# Patient Record
Sex: MALE | Race: BLACK OR AFRICAN AMERICAN | NOT HISPANIC OR LATINO | Employment: UNEMPLOYED | URBAN - METROPOLITAN AREA
[De-identification: names, ages, dates, MRNs, and addresses within clinical notes are randomized per-mention and may not be internally consistent; named-entity substitution may affect disease eponyms.]

---

## 2019-10-30 ENCOUNTER — HOSPITAL ENCOUNTER (EMERGENCY)
Facility: HOSPITAL | Age: 39
Discharge: HOME | End: 2019-10-30
Attending: EMERGENCY MEDICINE

## 2019-10-30 VITALS
TEMPERATURE: 98 F | HEART RATE: 90 BPM | WEIGHT: 185 LBS | DIASTOLIC BLOOD PRESSURE: 76 MMHG | SYSTOLIC BLOOD PRESSURE: 120 MMHG | BODY MASS INDEX: 23.74 KG/M2 | HEIGHT: 74 IN | OXYGEN SATURATION: 100 % | RESPIRATION RATE: 25 BRPM

## 2019-10-30 DIAGNOSIS — T50.901A OVERDOSE: Primary | ICD-10-CM

## 2019-10-30 DIAGNOSIS — T50.901A ACCIDENTAL DRUG OVERDOSE, INITIAL ENCOUNTER: ICD-10-CM

## 2019-10-30 LAB
ALBUMIN SERPL BCP-MCNC: 4.4 G/DL (ref 3.5–5.2)
ALP SERPL-CCNC: 73 U/L (ref 55–135)
ALT SERPL W/O P-5'-P-CCNC: 19 U/L (ref 10–44)
AMPHET+METHAMPHET UR QL: NORMAL
ANION GAP SERPL CALC-SCNC: 8 MMOL/L (ref 8–16)
AST SERPL-CCNC: 19 U/L (ref 10–40)
BARBITURATES UR QL SCN>200 NG/ML: NEGATIVE
BASOPHILS # BLD AUTO: 0.05 K/UL (ref 0–0.2)
BASOPHILS NFR BLD: 0.5 % (ref 0–1.9)
BENZODIAZ UR QL SCN>200 NG/ML: NEGATIVE
BILIRUB SERPL-MCNC: 0.3 MG/DL (ref 0.1–1)
BILIRUB UR QL STRIP: NEGATIVE
BUN SERPL-MCNC: 18 MG/DL (ref 6–20)
BZE UR QL SCN: NORMAL
CALCIUM SERPL-MCNC: 8.6 MG/DL (ref 8.7–10.5)
CANNABINOIDS UR QL SCN: NEGATIVE
CHLORIDE SERPL-SCNC: 106 MMOL/L (ref 95–110)
CLARITY UR: CLEAR
CO2 SERPL-SCNC: 24 MMOL/L (ref 23–29)
COLOR UR: YELLOW
CREAT SERPL-MCNC: 0.9 MG/DL (ref 0.5–1.4)
CREAT UR-MCNC: 184.1 MG/DL (ref 23–375)
DIFFERENTIAL METHOD: ABNORMAL
EOSINOPHIL # BLD AUTO: 0.1 K/UL (ref 0–0.5)
EOSINOPHIL NFR BLD: 1 % (ref 0–8)
ERYTHROCYTE [DISTWIDTH] IN BLOOD BY AUTOMATED COUNT: 14.4 % (ref 11.5–14.5)
EST. GFR  (AFRICAN AMERICAN): >60 ML/MIN/1.73 M^2
EST. GFR  (NON AFRICAN AMERICAN): >60 ML/MIN/1.73 M^2
ETHANOL SERPL-MCNC: <10 MG/DL
GLUCOSE SERPL-MCNC: 115 MG/DL (ref 70–110)
GLUCOSE UR QL STRIP: NEGATIVE
HCT VFR BLD AUTO: 38.9 % (ref 40–54)
HGB BLD-MCNC: 12.4 G/DL (ref 14–18)
HGB UR QL STRIP: NEGATIVE
IMM GRANULOCYTES # BLD AUTO: 0.02 K/UL (ref 0–0.04)
IMM GRANULOCYTES NFR BLD AUTO: 0.2 % (ref 0–0.5)
KETONES UR QL STRIP: NEGATIVE
LEUKOCYTE ESTERASE UR QL STRIP: NEGATIVE
LYMPHOCYTES # BLD AUTO: 2.4 K/UL (ref 1–4.8)
LYMPHOCYTES NFR BLD: 21.3 % (ref 18–48)
MCH RBC QN AUTO: 27.9 PG (ref 27–31)
MCHC RBC AUTO-ENTMCNC: 31.9 G/DL (ref 32–36)
MCV RBC AUTO: 87 FL (ref 82–98)
METHADONE UR QL SCN>300 NG/ML: NEGATIVE
MONOCYTES # BLD AUTO: 0.6 K/UL (ref 0.3–1)
MONOCYTES NFR BLD: 5 % (ref 4–15)
NEUTROPHILS # BLD AUTO: 8 K/UL (ref 1.8–7.7)
NEUTROPHILS NFR BLD: 72 % (ref 38–73)
NITRITE UR QL STRIP: NEGATIVE
NRBC BLD-RTO: 0 /100 WBC
OPIATES UR QL SCN: NEGATIVE
PCP UR QL SCN>25 NG/ML: NEGATIVE
PH UR STRIP: 6 [PH] (ref 5–8)
PLATELET # BLD AUTO: 266 K/UL (ref 150–350)
PMV BLD AUTO: 10.9 FL (ref 9.2–12.9)
POTASSIUM SERPL-SCNC: 4.1 MMOL/L (ref 3.5–5.1)
PROT SERPL-MCNC: 7.3 G/DL (ref 6–8.4)
PROT UR QL STRIP: NEGATIVE
RBC # BLD AUTO: 4.45 M/UL (ref 4.6–6.2)
SODIUM SERPL-SCNC: 138 MMOL/L (ref 136–145)
SP GR UR STRIP: 1.02 (ref 1–1.03)
TOXICOLOGY INFORMATION: NORMAL
TROPONIN I SERPL DL<=0.01 NG/ML-MCNC: 0.01 NG/ML (ref 0–0.03)
URN SPEC COLLECT METH UR: NORMAL
UROBILINOGEN UR STRIP-ACNC: NEGATIVE EU/DL
WBC # BLD AUTO: 11.09 K/UL (ref 3.9–12.7)

## 2019-10-30 PROCEDURE — 99284 EMERGENCY DEPT VISIT MOD MDM: CPT | Mod: 25

## 2019-10-30 PROCEDURE — 85025 COMPLETE CBC W/AUTO DIFF WBC: CPT

## 2019-10-30 PROCEDURE — 80320 DRUG SCREEN QUANTALCOHOLS: CPT

## 2019-10-30 PROCEDURE — 80053 COMPREHEN METABOLIC PANEL: CPT

## 2019-10-30 PROCEDURE — 81003 URINALYSIS AUTO W/O SCOPE: CPT | Mod: 59

## 2019-10-30 PROCEDURE — 84484 ASSAY OF TROPONIN QUANT: CPT

## 2019-10-30 PROCEDURE — 80307 DRUG TEST PRSMV CHEM ANLYZR: CPT

## 2019-10-30 RX ORDER — NALOXONE HYDROCHLORIDE 4 MG/.1ML
SPRAY NASAL
Qty: 1 EACH | Refills: 11 | Status: SHIPPED | OUTPATIENT
Start: 2019-10-30

## 2019-10-31 NOTE — ED TRIAGE NOTES
Brought in by EMS for possible overdose. Went to [facility] Med altered and lethargic. EMS gave 2mg narcan and patient became more alert.

## 2019-10-31 NOTE — ED PROVIDER NOTES
Encounter Date: 10/30/2019    SCRIBE #1 NOTE: I, America Jacobs, am scribing for, and in the presence of,  Isac Johnson MD. I have scribed the following portions of the note - Other sections scribed: HPI, ROS, PE, ED Management.       History     Chief Complaint   Patient presents with    Drug Overdose     pt sent from [facility] for [place] overdose. pt was unresponsive and given narcan and is alert, oriented and cooperative on arrival to [facility]     CC: Drug Overdose    HPI: The patient is a 39 y.o male, with SHx of Heroin use, who presents to the ED, complaining of began around 6:30 pm, PTA. Patient was initially brought to [facility] by a friend. Patient was ambulatory there. He was then transferred to this ED. Per EMS, the patient was altered and lethargic. He was given 2 mg of Narcan, with positive response. Patient states that he remembers getting ice cream prior to possible syncopal episode, that was witnessed by a friend. He states that this was his first drug overdose. SHx of heroin use for the past 5 yrs via snorting. Patient admits to snorting heroin this morning and around 5 pm this evening. He states he uses heroin daily.    The history is provided by the patient, the EMS personnel and a friend. No  was used.     Review of patient's allergies indicates:  No Known Allergies  History reviewed. No pertinent past medical history.  No past surgical history on file.  No family history on file.  Social History     Tobacco Use    Smoking status: Former Smoker   Substance Use Topics    Alcohol use: Not Currently    Drug use: Yes     Frequency: 1.0 times per week     Types: Cocaine, Methamphetamines     Review of Systems   Constitutional: Negative.    HENT: Negative.    Respiratory: Negative.    Cardiovascular: Negative.    Gastrointestinal: Negative.    Genitourinary: Negative.    Musculoskeletal: Negative.    Skin: Negative.     Neurological: Positive for syncope.   Psychiatric/Behavioral: Negative.        Physical Exam     Initial Vitals [10/30/19 2040]   BP Pulse Resp Temp SpO2   119/81 90 16 98.7 °F (37.1 °C) 97 %      MAP       --         Physical Exam    Nursing note and vitals reviewed.  Constitutional: He appears well-developed and well-nourished. He is not diaphoretic. No distress.   HENT:   Head: Normocephalic and atraumatic.   Eyes: Pupils are equal, round, and reactive to light.   Neck: Normal range of motion.   Cardiovascular: Normal rate and regular rhythm.   Pulmonary/Chest: Breath sounds normal. No respiratory distress.   Abdominal: Soft.   Musculoskeletal: Normal range of motion. He exhibits no edema or tenderness.   Neurological: He is alert.   Skin: Skin is warm and dry.   Psychiatric: He has a normal mood and affect.         ED Course   Procedures  Labs Reviewed   CBC W/ AUTO DIFFERENTIAL - Abnormal; Notable for the following components:       Result Value    RBC 4.45 (*)     Hemoglobin 12.4 (*)     Hematocrit 38.9 (*)     Mean Corpuscular Hemoglobin Conc 31.9 (*)     Gran # (ANC) 8.0 (*)     All other components within normal limits   COMPREHENSIVE METABOLIC PANEL - Abnormal; Notable for the following components:    Glucose 115 (*)     Calcium 8.6 (*)     All other components within normal limits   DRUG SCREEN PANEL, URINE EMERGENCY    Narrative:     Preferred Collection Type->Urine, Clean Catch   ALCOHOL,MEDICAL (ETHANOL)   TROPONIN I   URINALYSIS, REFLEX TO URINE CULTURE    Narrative:     Preferred Collection Type->Urine, Clean Catch          Imaging Results          X-Ray Chest 1 View (Final result)  Result time 10/30/19 21:50:19    Final result by Valentine Khan MD (10/30/19 21:50:19)                 Impression:      Unremarkable one view of the chest.      Electronically signed by: Valentine Khan  Date:    10/30/2019  Time:    21:50             Narrative:    EXAMINATION:  CHEST ONE VIEW    CLINICAL  HISTORY:  Poisoning by unspecified drugs, medicaments and biological substances, accidental (unintentional), initial encounter    TECHNIQUE:  One view of the chest.    COMPARISON:  None.    FINDINGS:  The cardiac silhouette is within normal limits.   There is no focal consolidation, pneumothorax, or pleural effusion.                                 Medical Decision Making:   History:   Old Medical Records: I decided to obtain old medical records.  Clinical Tests:   Lab Tests: Ordered and Reviewed  Radiological Study: Ordered and Reviewed  ED Management:  2109 Will order basic labs, US, Drug screen panel, Trop, and CXR. Will reassess patient.     2214 Reassessed patient.             Scribe Attestation:   Scribe #1: I performed the above scribed service and the documentation accurately describes the services I performed. I attest to the accuracy of the note.      MDM:    39 y.o.male with PMHx as ntoed above presents with overdose around 2 hrs pta, with reported narcan given pta. Physical exam remarkable for well appearing male, conversign with ease, CTAB.  ED workup remarkable for UDS - amphetamines/cocaine positive, ETOH negative, trop - 0.010, CXR - unremarkable.  Pt presentation consistent with overdose with ED observation > 2 hrs post narcan without any signs of depressed respiratory rate or alternative vital sign abnormality. At this time given patient's history, physical exam, and ED workup do not suspect acute overdose, aspiration, MI/ACS, or any further malignant cause. Pt given script for narcan upon discharge. Discussed diagnosis and further treatment with patient, including f/u with PCP in the next week.  Return precautions given and all questions answered.  Patient in understanding of plan.  Pt discharged to home improved and stable.         I, Isac Johnson M.D., personally performed the services described in this documentation. All medical record entries made by the scribe were at my direction and  in my presence.  I have reviewed the chart and agree that the record reflects my personal performance and is accurate and complete.       Clinical Impression:       ICD-10-CM ICD-9-CM   1. Overdose T50.901A 977.9     E980.5   2. Accidental drug overdose, initial encounter T50.901A 977.9     E858.9                                Isac Johnson MD  10/31/19 0350

## 2020-06-15 ENCOUNTER — HOSPITAL ENCOUNTER (EMERGENCY)
Facility: HOSPITAL | Age: 40
Discharge: HOME | End: 2020-06-16
Attending: EMERGENCY MEDICINE

## 2020-06-15 DIAGNOSIS — F10.920 ALCOHOLIC INTOXICATION WITHOUT COMPLICATION: Primary | ICD-10-CM

## 2020-06-15 DIAGNOSIS — R07.9 CHEST PAIN: ICD-10-CM

## 2020-06-15 LAB
ALBUMIN SERPL BCP-MCNC: 3.8 G/DL (ref 3.5–5.2)
ALP SERPL-CCNC: 76 U/L (ref 55–135)
ALT SERPL W/O P-5'-P-CCNC: 16 U/L (ref 10–44)
ANION GAP SERPL CALC-SCNC: 10 MMOL/L (ref 8–16)
AST SERPL-CCNC: 19 U/L (ref 10–40)
BASOPHILS # BLD AUTO: 0.04 K/UL (ref 0–0.2)
BASOPHILS NFR BLD: 0.5 % (ref 0–1.9)
BILIRUB SERPL-MCNC: 0.2 MG/DL (ref 0.1–1)
BUN SERPL-MCNC: 13 MG/DL (ref 6–20)
CALCIUM SERPL-MCNC: 8.2 MG/DL (ref 8.7–10.5)
CHLORIDE SERPL-SCNC: 116 MMOL/L (ref 95–110)
CO2 SERPL-SCNC: 22 MMOL/L (ref 23–29)
CREAT SERPL-MCNC: 1 MG/DL (ref 0.5–1.4)
DIFFERENTIAL METHOD: ABNORMAL
EOSINOPHIL # BLD AUTO: 0.2 K/UL (ref 0–0.5)
EOSINOPHIL NFR BLD: 2.8 % (ref 0–8)
ERYTHROCYTE [DISTWIDTH] IN BLOOD BY AUTOMATED COUNT: 13.5 % (ref 11.5–14.5)
EST. GFR  (AFRICAN AMERICAN): >60 ML/MIN/1.73 M^2
EST. GFR  (NON AFRICAN AMERICAN): >60 ML/MIN/1.73 M^2
ETHANOL SERPL-MCNC: 358 MG/DL
GLUCOSE SERPL-MCNC: 112 MG/DL (ref 70–110)
HCT VFR BLD AUTO: 40.6 % (ref 40–54)
HGB BLD-MCNC: 13.3 G/DL (ref 14–18)
IMM GRANULOCYTES # BLD AUTO: 0.01 K/UL (ref 0–0.04)
IMM GRANULOCYTES NFR BLD AUTO: 0.1 % (ref 0–0.5)
LIPASE SERPL-CCNC: 46 U/L (ref 4–60)
LYMPHOCYTES # BLD AUTO: 3.1 K/UL (ref 1–4.8)
LYMPHOCYTES NFR BLD: 41.4 % (ref 18–48)
MCH RBC QN AUTO: 27.7 PG (ref 27–31)
MCHC RBC AUTO-ENTMCNC: 32.8 G/DL (ref 32–36)
MCV RBC AUTO: 85 FL (ref 82–98)
MONOCYTES # BLD AUTO: 0.2 K/UL (ref 0.3–1)
MONOCYTES NFR BLD: 3.1 % (ref 4–15)
NEUTROPHILS # BLD AUTO: 3.9 K/UL (ref 1.8–7.7)
NEUTROPHILS NFR BLD: 52.1 % (ref 38–73)
NRBC BLD-RTO: 0 /100 WBC
PLATELET # BLD AUTO: 261 K/UL (ref 150–350)
PMV BLD AUTO: 11.1 FL (ref 9.2–12.9)
POTASSIUM SERPL-SCNC: 3.7 MMOL/L (ref 3.5–5.1)
PROT SERPL-MCNC: 7.3 G/DL (ref 6–8.4)
RBC # BLD AUTO: 4.8 M/UL (ref 4.6–6.2)
SODIUM SERPL-SCNC: 148 MMOL/L (ref 136–145)
WBC # BLD AUTO: 7.42 K/UL (ref 3.9–12.7)

## 2020-06-15 PROCEDURE — 80053 COMPREHEN METABOLIC PANEL: CPT

## 2020-06-15 PROCEDURE — 96360 HYDRATION IV INFUSION INIT: CPT

## 2020-06-15 PROCEDURE — 83880 ASSAY OF NATRIURETIC PEPTIDE: CPT

## 2020-06-15 PROCEDURE — 84484 ASSAY OF TROPONIN QUANT: CPT

## 2020-06-15 PROCEDURE — 93005 ELECTROCARDIOGRAM TRACING: CPT

## 2020-06-15 PROCEDURE — 80320 DRUG SCREEN QUANTALCOHOLS: CPT

## 2020-06-15 PROCEDURE — 93010 EKG 12-LEAD: ICD-10-PCS | Mod: ,,, | Performed by: INTERNAL MEDICINE

## 2020-06-15 PROCEDURE — 93010 ELECTROCARDIOGRAM REPORT: CPT | Mod: ,,, | Performed by: INTERNAL MEDICINE

## 2020-06-15 PROCEDURE — 99285 EMERGENCY DEPT VISIT HI MDM: CPT | Mod: 25

## 2020-06-15 PROCEDURE — 96361 HYDRATE IV INFUSION ADD-ON: CPT

## 2020-06-15 PROCEDURE — 83690 ASSAY OF LIPASE: CPT

## 2020-06-15 PROCEDURE — 25000003 PHARM REV CODE 250: Performed by: NURSE PRACTITIONER

## 2020-06-15 PROCEDURE — 85025 COMPLETE CBC W/AUTO DIFF WBC: CPT

## 2020-06-15 RX ADMIN — SODIUM CHLORIDE 1000 ML: 0.9 INJECTION, SOLUTION INTRAVENOUS at 11:06

## 2020-06-16 VITALS
TEMPERATURE: 99 F | WEIGHT: 180 LBS | RESPIRATION RATE: 23 BRPM | HEART RATE: 99 BPM | BODY MASS INDEX: 23.1 KG/M2 | DIASTOLIC BLOOD PRESSURE: 66 MMHG | HEIGHT: 74 IN | OXYGEN SATURATION: 100 % | SYSTOLIC BLOOD PRESSURE: 109 MMHG

## 2020-06-16 LAB
BNP SERPL-MCNC: 15 PG/ML (ref 0–99)
TROPONIN I SERPL DL<=0.01 NG/ML-MCNC: <0.006 NG/ML (ref 0–0.03)

## 2020-06-16 PROCEDURE — 25000003 PHARM REV CODE 250: Performed by: NURSE PRACTITIONER

## 2020-06-16 RX ORDER — THIAMINE HCL 100 MG
100 TABLET ORAL
Status: COMPLETED | OUTPATIENT
Start: 2020-06-16 | End: 2020-06-16

## 2020-06-16 RX ORDER — FOLIC ACID 1 MG/1
1 TABLET ORAL
Status: COMPLETED | OUTPATIENT
Start: 2020-06-16 | End: 2020-06-16

## 2020-06-16 RX ADMIN — Medication 100 MG: at 01:06

## 2020-06-16 RX ADMIN — FOLIC ACID 1 MG: 1 TABLET ORAL at 01:06

## 2020-06-16 RX ADMIN — THERA TABS 1 TABLET: TAB at 01:06

## 2020-06-16 NOTE — ED PROVIDER NOTES
"Encounter Date: 6/15/2020       History     Chief Complaint   Patient presents with    Alcohol Intoxication     Pt arrives via EMS, found lying in street, +ETOH, +HEROIN, +METH, given 500mL NS en route, upon arrival to ED, Pt AAOX3     CC:  Intoxication    HPI: 40-year-old male with history of overdose presenting to the ED the EMS after he was found lying in the street.  Patient admits to drinking 1 pt of vodka and using meth this evening.  Given 500 mL normal saline by EMS.  He also reports intermittent midsternal chest pain described as "stabbing" that began this evening prior to meth use.  He denies any other pain or symptoms.    The history is provided by the patient. No  was used.     Review of patient's allergies indicates:  No Known Allergies  No past medical history on file.  No past surgical history on file.  No family history on file.  Social History     Tobacco Use    Smoking status: Former Smoker   Substance Use Topics    Alcohol use: Not Currently    Drug use: Yes     Frequency: 1.0 times per week     Types: Cocaine, Methamphetamines     Review of Systems   Constitutional: Negative for chills and fever.   HENT: Negative for sore throat.    Respiratory: Negative for shortness of breath.    Cardiovascular: Positive for chest pain.   Gastrointestinal: Negative for anal bleeding, nausea and vomiting.   Genitourinary: Negative for dysuria.   Musculoskeletal: Negative for back pain.   Skin: Negative for rash.   Neurological: Negative for weakness.   Hematological: Does not bruise/bleed easily.       Physical Exam     Initial Vitals [06/15/20 2217]   BP Pulse Resp Temp SpO2   113/72 104 18 98.5 °F (36.9 °C) 99 %      MAP       --         Physical Exam    Vitals reviewed.  Constitutional: He appears well-developed and well-nourished. He is not diaphoretic.  Non-toxic appearance. He does not have a sickly appearance. He does not appear ill. No distress.   HENT:   Head: Normocephalic and " atraumatic.   Right Ear: External ear normal.   Left Ear: External ear normal.   Nose: Nose normal.   Mouth/Throat: Oropharynx is clear and moist.   Head and face atraumatic.   Eyes: Conjunctivae and EOM are normal. Pupils are equal, round, and reactive to light.   Neck: Normal range of motion.   Cardiovascular: Normal rate, regular rhythm, normal heart sounds and intact distal pulses. Exam reveals no gallop and no friction rub.    No murmur heard.  Pulmonary/Chest: Breath sounds normal. No respiratory distress.   Abdominal: Soft. Bowel sounds are normal. There is no abdominal tenderness.   Musculoskeletal: Normal range of motion.      Comments: No tenderness palpation of the midline cervical, thoracic, lumbar spine. 5/5 strength BUE and BLE.    Neurological: He is alert and oriented to person, place, and time. He has normal strength. GCS eye subscore is 4. GCS verbal subscore is 5. GCS motor subscore is 6.   Intoxicated.  Easily arousable.  Conversant.    Skin: Skin is warm, dry and intact. Capillary refill takes less than 2 seconds. No rash noted. No erythema.   Psychiatric: He has a normal mood and affect. Thought content normal.         ED Course   Procedures  Labs Reviewed   CBC W/ AUTO DIFFERENTIAL - Abnormal; Notable for the following components:       Result Value    Hemoglobin 13.3 (*)     Mono # 0.2 (*)     Mono% 3.1 (*)     All other components within normal limits   COMPREHENSIVE METABOLIC PANEL - Abnormal; Notable for the following components:    Sodium 148 (*)     Chloride 116 (*)     CO2 22 (*)     Glucose 112 (*)     Calcium 8.2 (*)     All other components within normal limits   ALCOHOL,MEDICAL (ETHANOL) - Abnormal; Notable for the following components:    Alcohol, Medical, Serum 358 (*)     All other components within normal limits    Narrative:     ALcohol critical result(s) called and verbal readback obtained from   Alexandra Wallace by DEAN 06/15/2020 23:53  Alcohol critical result(s) called and  verbal readback obtained from   Alexandra Reid by [facility] 06/15/2020 23:52   TROPONIN I   B-TYPE NATRIURETIC PEPTIDE   LIPASE   DRUG SCREEN PANEL, URINE EMERGENCY          Imaging Results          CT Head Without Contrast (Final result)  Result time 06/16/20 00:35:53    Final result by Valentine Khan MD (06/16/20 00:35:53)                 Impression:      No acute intracranial abnormality detected.    Mild vertebral body height loss at C4 without retropulsion.  Question chronicity.  Associated prevertebral soft tissue swelling.      Electronically signed by: Valentine Khan  Date:    06/16/2020  Time:    00:35             Narrative:    EXAMINATION:  CT OF THE HEAD WITHOUT AND CT CERVICAL SPINE    CLINICAL HISTORY:  Altered mental status;; Neck pain, normal neuro exam;    TECHNIQUE:  5 mm unenhanced axial images were obtained from the skull base to the vertex.  1.25 mm axial images were obtained through the cervical spine.    COMPARISON:  None.    FINDINGS:  CT head: The ventricles, basal cisterns, and cortical sulci are within normal limits for patient's stated age. There is no acute intracranial hemorrhage, territorial infarct or mass effect, or midline shift. In the visualized mastoid air cells, there is partial opacification of the inferior left mastoid air cells and partial sclerosing of the right mastoid air cells.  There is mild mucoperiosteal thickening seen in the left sphenoid sinus.    CT cervical spine: There is <normal alignment of the cervical spine>.  There is mild height loss at C4 without retropulsion.  Mild endplate sclerosing is seen at C3/C4.  At C3/C4, there is a left paracentral disc protrusion and mild to moderate central canal narrowing.  The bones are normally mineralized.                               CT Cervical Spine Without Contrast (Final result)  Result time 06/16/20 00:35:53    Final result by Valentine Khan MD (06/16/20 00:35:53)                 Impression:      No acute  intracranial abnormality detected.    Mild vertebral body height loss at C4 without retropulsion.  Question chronicity.  Associated prevertebral soft tissue swelling.      Electronically signed by: Valentine Khan  Date:    06/16/2020  Time:    00:35             Narrative:    EXAMINATION:  CT OF THE HEAD WITHOUT AND CT CERVICAL SPINE    CLINICAL HISTORY:  Altered mental status;; Neck pain, normal neuro exam;    TECHNIQUE:  5 mm unenhanced axial images were obtained from the skull base to the vertex.  1.25 mm axial images were obtained through the cervical spine.    COMPARISON:  None.    FINDINGS:  CT head: The ventricles, basal cisterns, and cortical sulci are within normal limits for patient's stated age. There is no acute intracranial hemorrhage, territorial infarct or mass effect, or midline shift. In the visualized mastoid air cells, there is partial opacification of the inferior left mastoid air cells and partial sclerosing of the right mastoid air cells.  There is mild mucoperiosteal thickening seen in the left sphenoid sinus.    CT cervical spine: There is <normal alignment of the cervical spine>.  There is mild height loss at C4 without retropulsion.  Mild endplate sclerosing is seen at C3/C4.  At C3/C4, there is a left paracentral disc protrusion and mild to moderate central canal narrowing.  The bones are normally mineralized.                               X-Ray Chest AP Portable (Final result)  Result time 06/15/20 23:46:02    Final result by Cecilia Monte MD (06/15/20 23:46:02)                 Impression:      No acute intrathoracic abnormality identified on this single radiographic view of the chest.      Electronically signed by: Cecilia Monte MD  Date:    06/15/2020  Time:    23:46             Narrative:    EXAMINATION:  XR CHEST AP PORTABLE    CLINICAL HISTORY:  Chest Pain;    TECHNIQUE:  Single frontal view of the chest was performed.    COMPARISON:  10/30/2019    FINDINGS:  The cardiomediastinal  silhouette is within normal limits. The visualized airway is unremarkable.  The lungs appear symmetrically aerated without definite focal alveolar consolidation. No large pleural effusion or pneumothorax is appreciated.Visualized osseous structures are intact.                                 Medical Decision Making:   ED Management:  40-year-old male presenting to the ED for evaluation alcohol intoxication.  Brought in by EMS.  Admits to drinking alcohol and methamphetamine use.  Denies any injury or trauma.  He is well-appearing and does not have any signs of injury or trauma.  He is conversing with ease.  CBC and CMP unremarkable.  Lipase normal.  Troponin negative.  Chest x-ray normal.  BNP negative.  Ethanol 358.  Pt presentation consistent with alcohol intoxication with ED observation > 2 hrs without any signs of depressed respiratory rate.  CT scan of head negative for acute process.   CT cervical spine with mild vertebral body height loss at C4.  No retropulsion.  There is possible prevertebral soft tissue swelling.  Patient is asymptomatic.  He has no midline tenderness.  He has no weakness, numbness, tingling.   Likely chronic.  Ambulated without difficulty and continues to be alert and awake.  At this time given patient's history, physical exam, and ED workup do not suspect acute overdose, aspiration, MI/ACS, or any emergent or life-threatening process.  His brother is at the bedside and will care for him this evening.    This case was discussed with my attending physician who agrees with my plan of care.                                    Clinical Impression:       ICD-10-CM ICD-9-CM   1. Alcoholic intoxication without complication  F10.920 305.00   2. Chest pain  R07.9 786.50         Disposition:   Disposition: Discharged  Condition: Stable     ED Disposition Condition    Discharge Stable        ED Prescriptions     None        Follow-up Information     Follow up With Specialties Details Why Contact Info     [facility]  Schedule an appointment as soon as possible for a visit in 1 day For follow-up if you do not have a primary care doctor [address]  [place] [address]  [phone]      [facility] Emergency Medicine Go to  If symptoms worsen [address] [place] [id number] [phone]                                     Malinda Euceda NP  06/16/20 0212

## 2020-06-16 NOTE — DISCHARGE INSTRUCTIONS
Please return to the Emergency Department for any new or worsening symptoms including:  Headache, neck pain, numbness tingling, weakness, extremity weakness, fever, chest pain, shortness of breath, loss of consciousness, dizziness, or any other concerns.     Please follow up with your Primary Care Provider within in the week. If you do not have one, you may contact the one listed on your discharge paperwork or you may also call the [facility] Appointment Desk at [phone] to schedule an appointment with one.

## 2020-06-16 NOTE — ED NOTES
Patient walked with steady gait. Patient denies dizziness or light headedness. Patients brother at bedside

## 2021-12-24 ENCOUNTER — HOSPITAL ENCOUNTER (EMERGENCY)
Facility: HOSPITAL | Age: 41
Discharge: HOME | End: 2021-12-24
Attending: EMERGENCY MEDICINE
Payer: MEDICAID

## 2021-12-24 VITALS
SYSTOLIC BLOOD PRESSURE: 105 MMHG | RESPIRATION RATE: 23 BRPM | HEIGHT: 72 IN | WEIGHT: 180 LBS | OXYGEN SATURATION: 100 % | DIASTOLIC BLOOD PRESSURE: 58 MMHG | HEART RATE: 66 BPM | BODY MASS INDEX: 24.38 KG/M2 | TEMPERATURE: 98 F

## 2021-12-24 DIAGNOSIS — F10.929 ALCOHOL INTOXICATION: ICD-10-CM

## 2021-12-24 DIAGNOSIS — F19.10 POLYSUBSTANCE ABUSE: Primary | ICD-10-CM

## 2021-12-24 DIAGNOSIS — L03.116 CELLULITIS OF LEFT LOWER LEG: ICD-10-CM

## 2021-12-24 LAB
ALBUMIN SERPL BCP-MCNC: 3.6 G/DL (ref 3.5–5.2)
ALP SERPL-CCNC: 80 U/L (ref 55–135)
ALT SERPL W/O P-5'-P-CCNC: 11 U/L (ref 10–44)
AMPHET+METHAMPHET UR QL: ABNORMAL
ANION GAP SERPL CALC-SCNC: 13 MMOL/L (ref 8–16)
APAP SERPL-MCNC: <3 UG/ML (ref 10–20)
AST SERPL-CCNC: 20 U/L (ref 10–40)
BARBITURATES UR QL SCN>200 NG/ML: NEGATIVE
BASOPHILS # BLD AUTO: 0.01 K/UL (ref 0–0.2)
BASOPHILS NFR BLD: 0.1 % (ref 0–1.9)
BENZODIAZ UR QL SCN>200 NG/ML: ABNORMAL
BILIRUB SERPL-MCNC: 0.4 MG/DL (ref 0.1–1)
BUN SERPL-MCNC: 23 MG/DL (ref 6–20)
BZE UR QL SCN: ABNORMAL
CALCIUM SERPL-MCNC: 9.3 MG/DL (ref 8.7–10.5)
CANNABINOIDS UR QL SCN: NEGATIVE
CHLORIDE SERPL-SCNC: 101 MMOL/L (ref 95–110)
CO2 SERPL-SCNC: 22 MMOL/L (ref 23–29)
CREAT SERPL-MCNC: 1.9 MG/DL (ref 0.5–1.4)
CREAT UR-MCNC: 142.2 MG/DL (ref 23–375)
DIFFERENTIAL METHOD: ABNORMAL
EOSINOPHIL # BLD AUTO: 1 K/UL (ref 0–0.5)
EOSINOPHIL NFR BLD: 8.7 % (ref 0–8)
ERYTHROCYTE [DISTWIDTH] IN BLOOD BY AUTOMATED COUNT: 12.8 % (ref 11.5–14.5)
EST. GFR  (AFRICAN AMERICAN): 50 ML/MIN/1.73 M^2
EST. GFR  (NON AFRICAN AMERICAN): 43 ML/MIN/1.73 M^2
ETHANOL SERPL-MCNC: 11 MG/DL
GLUCOSE SERPL-MCNC: 85 MG/DL (ref 70–110)
HCT VFR BLD AUTO: 35.3 % (ref 40–54)
HGB BLD-MCNC: 11.6 G/DL (ref 14–18)
IMM GRANULOCYTES # BLD AUTO: 0.04 K/UL (ref 0–0.04)
IMM GRANULOCYTES NFR BLD AUTO: 0.3 % (ref 0–0.5)
LYMPHOCYTES # BLD AUTO: 1.4 K/UL (ref 1–4.8)
LYMPHOCYTES NFR BLD: 12.5 % (ref 18–48)
MCH RBC QN AUTO: 28.4 PG (ref 27–31)
MCHC RBC AUTO-ENTMCNC: 32.9 G/DL (ref 32–36)
MCV RBC AUTO: 87 FL (ref 82–98)
METHADONE UR QL SCN>300 NG/ML: NEGATIVE
MONOCYTES # BLD AUTO: 0.3 K/UL (ref 0.3–1)
MONOCYTES NFR BLD: 2.5 % (ref 4–15)
NEUTROPHILS # BLD AUTO: 8.7 K/UL (ref 1.8–7.7)
NEUTROPHILS NFR BLD: 75.9 % (ref 38–73)
NRBC BLD-RTO: 0 /100 WBC
OPIATES UR QL SCN: ABNORMAL
PCP UR QL SCN>25 NG/ML: NEGATIVE
PLATELET # BLD AUTO: 223 K/UL (ref 150–450)
PMV BLD AUTO: 11.2 FL (ref 9.2–12.9)
POCT GLUCOSE: 94 MG/DL (ref 70–110)
POTASSIUM SERPL-SCNC: 4.1 MMOL/L (ref 3.5–5.1)
PROT SERPL-MCNC: 6.9 G/DL (ref 6–8.4)
RBC # BLD AUTO: 4.08 M/UL (ref 4.6–6.2)
SALICYLATES SERPL-MCNC: <5 MG/DL (ref 15–30)
SODIUM SERPL-SCNC: 136 MMOL/L (ref 136–145)
TOXICOLOGY INFORMATION: ABNORMAL
WBC # BLD AUTO: 11.48 K/UL (ref 3.9–12.7)

## 2021-12-24 PROCEDURE — 99284 EMERGENCY DEPT VISIT MOD MDM: CPT | Mod: 25

## 2021-12-24 PROCEDURE — 96361 HYDRATE IV INFUSION ADD-ON: CPT

## 2021-12-24 PROCEDURE — 80143 DRUG ASSAY ACETAMINOPHEN: CPT | Performed by: EMERGENCY MEDICINE

## 2021-12-24 PROCEDURE — 80179 DRUG ASSAY SALICYLATE: CPT | Performed by: EMERGENCY MEDICINE

## 2021-12-24 PROCEDURE — 93005 ELECTROCARDIOGRAM TRACING: CPT

## 2021-12-24 PROCEDURE — 93010 EKG 12-LEAD: ICD-10-PCS | Mod: ,,, | Performed by: INTERNAL MEDICINE

## 2021-12-24 PROCEDURE — 93010 ELECTROCARDIOGRAM REPORT: CPT | Mod: ,,, | Performed by: INTERNAL MEDICINE

## 2021-12-24 PROCEDURE — 25000003 PHARM REV CODE 250: Performed by: EMERGENCY MEDICINE

## 2021-12-24 PROCEDURE — 80053 COMPREHEN METABOLIC PANEL: CPT | Performed by: EMERGENCY MEDICINE

## 2021-12-24 PROCEDURE — 82077 ASSAY SPEC XCP UR&BREATH IA: CPT | Performed by: EMERGENCY MEDICINE

## 2021-12-24 PROCEDURE — 80307 DRUG TEST PRSMV CHEM ANLYZR: CPT | Performed by: EMERGENCY MEDICINE

## 2021-12-24 PROCEDURE — 85025 COMPLETE CBC W/AUTO DIFF WBC: CPT | Performed by: EMERGENCY MEDICINE

## 2021-12-24 PROCEDURE — 96374 THER/PROPH/DIAG INJ IV PUSH: CPT

## 2021-12-24 PROCEDURE — 63600175 PHARM REV CODE 636 W HCPCS: Performed by: EMERGENCY MEDICINE

## 2021-12-24 PROCEDURE — 82962 GLUCOSE BLOOD TEST: CPT

## 2021-12-24 RX ORDER — DOXYCYCLINE 100 MG/1
100 CAPSULE ORAL 2 TIMES DAILY
Qty: 20 CAPSULE | Refills: 0 | Status: SHIPPED | OUTPATIENT
Start: 2021-12-24 | End: 2022-01-03

## 2021-12-24 RX ORDER — NALOXONE HCL 0.4 MG/ML
0.4 VIAL (ML) INJECTION
Status: COMPLETED | OUTPATIENT
Start: 2021-12-24 | End: 2021-12-24

## 2021-12-24 RX ORDER — NALOXONE HCL 0.4 MG/ML
0.4 VIAL (ML) INJECTION
Status: DISCONTINUED | OUTPATIENT
Start: 2021-12-24 | End: 2021-12-24 | Stop reason: HOSPADM

## 2021-12-24 RX ORDER — NALOXONE HYDROCHLORIDE 4 MG/.1ML
SPRAY NASAL
Qty: 1 EACH | Refills: 11 | Status: SHIPPED | OUTPATIENT
Start: 2021-12-24

## 2021-12-24 RX ORDER — DOXYCYCLINE HYCLATE 100 MG
100 TABLET ORAL
Status: COMPLETED | OUTPATIENT
Start: 2021-12-24 | End: 2021-12-24

## 2021-12-24 RX ADMIN — NALOXONE HYDROCHLORIDE 0.4 MG: 0.4 INJECTION, SOLUTION INTRAMUSCULAR; INTRAVENOUS; SUBCUTANEOUS at 12:12

## 2021-12-24 RX ADMIN — SODIUM CHLORIDE 1000 ML: 0.9 INJECTION, SOLUTION INTRAVENOUS at 04:12

## 2021-12-24 RX ADMIN — DOXYCYCLINE HYCLATE 100 MG: 100 TABLET, COATED ORAL at 06:12

## 2021-12-24 RX ADMIN — SODIUM CHLORIDE 1000 ML: 0.9 INJECTION, SOLUTION INTRAVENOUS at 01:12

## 2021-12-24 NOTE — ED TRIAGE NOTES
"Patient presents to ED c/o via EMS after being found on side of road. Patient is alert states "I just remember taking a lot of xanax, use heroin and was drinking." Patient does not remember how he got to the ED. Patient has notified family he's in the ED . Denies any other complaints at present time.  "

## 2021-12-24 NOTE — ED PROVIDER NOTES
Encounter Date: 12/24/2021    SCRIBE #1 NOTE: I, Leah Fenton, am scribing for, and in the presence of,  Wan Sanchez MD. I have scribed the following portions of the note - Other sections scribed: HPI, ROS, PE.       History     Chief Complaint   Patient presents with    Alcohol Intoxication     Presents to the ED with c/o ETOH and reports he was recently rx pain meds by his doc for knee pain. Was found passed out on side the road. Patient has slurred speech, pinpoint pupils. Admits to drinking heavily and taking more than rx pain meds. Able to maintain airway appropriately.      CC: Alcohol Intoxication    HPI: This 41 y.o male, with no medical history, presents to the ED via EMS transportation for alcohol intoxication. Per EMS, pt was found on the side of the road by police prompting EMS to be called. Pt admits to heavy alcohol consumption as well as use of prescribed pain medication, xanax and heroin. He states that he currently feels fine. No other associated symptoms.     The history is provided by the patient.     Review of patient's allergies indicates:  No Known Allergies  History reviewed. No pertinent past medical history.  History reviewed. No pertinent surgical history.  History reviewed. No pertinent family history.  Social History     Tobacco Use    Smoking status: Former Smoker    Smokeless tobacco: Never Used   Substance Use Topics    Alcohol use: Not Currently    Drug use: Yes     Frequency: 1.0 times per week     Types: Cocaine, Methamphetamines     Comment: heroin(nasal)     Review of Systems   Constitutional: Negative for fever.        (+) alcohol intoxication; (+) use of prescribed pain medication, xanax and heroin   HENT: Negative for sore throat.    Eyes: Negative for visual disturbance.   Respiratory: Negative for shortness of breath.    Cardiovascular: Negative for chest pain.   Gastrointestinal: Negative for nausea.   Genitourinary: Negative for dysuria.   Musculoskeletal:  Negative for back pain.   Skin: Negative for rash.   Neurological: Negative for weakness.       Physical Exam     Initial Vitals [12/24/21 1214]   BP Pulse Resp Temp SpO2   (!) 90/54 99 16 97.9 °F (36.6 °C) 95 %      MAP       --         Physical Exam    Nursing note and vitals reviewed.  Constitutional: He appears well-developed and well-nourished. He is not diaphoretic. No distress.   Patient is alert and responsive after receiving Narcan in the ED.   HENT:   Head: Normocephalic and atraumatic.   Mouth/Throat: Oropharynx is clear and moist.   Eyes: EOM are normal. Pupils are equal, round, and reactive to light.   Neck: Neck supple.   Cardiovascular: Normal rate and regular rhythm.   Pulmonary/Chest: Breath sounds normal. No respiratory distress.   Abdominal: Abdomen is soft. Bowel sounds are normal.   Musculoskeletal:         General: No edema.      Cervical back: Neck supple.      Comments: Erythema to left lower leg. One area of blister with adjacent linear blisters. Not purulent. Potential contact dermatitis vs less likely cellulitis.      Neurological: He is alert and oriented to person, place, and time.   Skin: Skin is warm and dry.   Psychiatric: He has a normal mood and affect.         ED Course   Procedures  Labs Reviewed   COMPREHENSIVE METABOLIC PANEL - Abnormal; Notable for the following components:       Result Value    CO2 22 (*)     BUN 23 (*)     Creatinine 1.9 (*)     eGFR if  50 (*)     eGFR if non  43 (*)     All other components within normal limits   CBC W/ AUTO DIFFERENTIAL - Abnormal; Notable for the following components:    RBC 4.08 (*)     Hemoglobin 11.6 (*)     Hematocrit 35.3 (*)     Gran # (ANC) 8.7 (*)     Eos # 1.0 (*)     Gran % 75.9 (*)     Lymph % 12.5 (*)     Mono % 2.5 (*)     Eosinophil % 8.7 (*)     All other components within normal limits   DRUG SCREEN PANEL, URINE EMERGENCY - Abnormal; Notable for the following components:    Benzodiazepines  Presumptive Positive (*)     Cocaine (Metab.) Presumptive Positive (*)     Opiate Scrn, Ur Presumptive Positive (*)     Amphetamine Screen, Ur Presumptive Positive (*)     All other components within normal limits    Narrative:     Specimen Source->Urine   ALCOHOL,MEDICAL (ETHANOL) - Abnormal; Notable for the following components:    Alcohol, Serum 11 (*)     All other components within normal limits   SALICYLATE LEVEL - Abnormal; Notable for the following components:    Salicylate Lvl <5.0 (*)     All other components within normal limits   ACETAMINOPHEN LEVEL - Abnormal; Notable for the following components:    Acetaminophen (Tylenol), Serum <3.0 (*)     All other components within normal limits   POCT GLUCOSE   POCT GLUCOSE MONITORING CONTINUOUS     Polysubstance abuse.  Patient without complaints. Renal function elevated. Received 2 liters of IVFs. HR 60, Pt now awake and tolerating po. Has family at the bedside. Patient wants to be discharged. Past blood pressures were systolic 105 last. With patient asymptoamtic and within 10 points of last visit I feel patient is stable for discharge.        Imaging Results    None          Medications   naloxone 0.4 mg/mL injection 0.4 mg (0.4 mg Intravenous Not Given 12/24/21 1500)   doxycycline tablet 100 mg (has no administration in time range)   sodium chloride 0.9% bolus 1,000 mL (0 mLs Intravenous Stopped 12/24/21 1606)   naloxone 0.4 mg/mL injection 0.4 mg (0.4 mg Intravenous Given 12/24/21 1245)   sodium chloride 0.9% bolus 1,000 mL (1,000 mLs Intravenous New Bag 12/24/21 1618)              Scribe Attestation:   Scribe #1: I performed the above scribed service and the documentation accurately describes the services I performed. I attest to the accuracy of the note.                 Clinical Impression:   Final diagnoses:  [F10.929] Alcohol intoxication  [F19.10] Polysubstance abuse (Primary)  [L03.116] Cellulitis of left lower leg          ED Disposition Condition     Discharge Stable        ED Prescriptions     Medication Sig Dispense Start Date End Date Auth. Provider    naloxone (NARCAN) 4 mg/actuation Spry 4mg by nasal route as needed for opioid overdose; may repeat every 2-3 minutes in alternating nostrils until medical help arrives. Call 911 1 each 12/24/2021  Wan Sanchez MD    doxycycline (VIBRAMYCIN) 100 MG Cap Take 1 capsule (100 mg total) by mouth 2 (two) times daily. for 10 days 20 capsule 12/24/2021 1/3/2022 Wan Sanchez MD        Follow-up Information     Follow up With Specialties Details Why Contact Info    Princess RADHA Rosa MD Internal Medicine, Pediatrics Schedule an appointment as soon as possible for a visit  to establish primary care and recheck of your kidney function. [address]  [facility] [address]  [phone]      [facility]  Schedule an appointment as soon as possible for a visit  or follow up here for priamry care and recheck of your kidney function. [address] [place] [address]  [phone]      [facility] Emergency Medicine  As needed, If symptoms worsen or any concerns. [address] [facility] [address] [phone]    Brock Plunkett MD Dermatology Schedule an appointment as soon as possible for a visit  for further evaluation of rash to left lower leg. [address] [address]  [facility] [address]  [phone]             I, Wan Sanchez, personally performed the services described in this documentation. All medical record entries made by the scribe were at my direction and in my presence. I have reviewed the chart and agree that the record reflects my personal performance and is accurate and complete.     Wan Sanchez MD  12/24/21 171       Wan Sanchez MD  12/24/21 4919

## 2024-09-26 ENCOUNTER — HOSPITAL ENCOUNTER (EMERGENCY)
Facility: HOSPITAL | Age: 44
Discharge: PSYCHIATRIC OR SUBSTANCE USE FACILITY | End: 2024-09-27
Attending: EMERGENCY MEDICINE
Payer: MEDICAID

## 2024-09-26 DIAGNOSIS — F32.A DEPRESSION, UNSPECIFIED DEPRESSION TYPE: Primary | ICD-10-CM

## 2024-09-26 DIAGNOSIS — T50.904A OVERDOSE OF UNDETERMINED INTENT, INITIAL ENCOUNTER: ICD-10-CM

## 2024-09-26 DIAGNOSIS — F19.10 POLYSUBSTANCE ABUSE: ICD-10-CM

## 2024-09-26 LAB
ALBUMIN SERPL BCP-MCNC: 3.9 G/DL (ref 3.5–5.2)
ALP SERPL-CCNC: 90 U/L (ref 55–135)
ALT SERPL W/O P-5'-P-CCNC: 17 U/L (ref 10–44)
AMPHET+METHAMPHET UR QL: ABNORMAL
ANION GAP SERPL CALC-SCNC: 9 MMOL/L (ref 8–16)
APAP SERPL-MCNC: <3 UG/ML (ref 10–20)
AST SERPL-CCNC: 25 U/L (ref 10–40)
BARBITURATES UR QL SCN>200 NG/ML: NEGATIVE
BASOPHILS # BLD AUTO: 0.07 K/UL (ref 0–0.2)
BASOPHILS NFR BLD: 0.7 % (ref 0–1.9)
BENZODIAZ UR QL SCN>200 NG/ML: ABNORMAL
BILIRUB SERPL-MCNC: 0.5 MG/DL (ref 0.1–1)
BILIRUB UR QL STRIP: NEGATIVE
BUN SERPL-MCNC: 13 MG/DL (ref 6–20)
BZE UR QL SCN: ABNORMAL
CALCIUM SERPL-MCNC: 9.1 MG/DL (ref 8.7–10.5)
CANNABINOIDS UR QL SCN: NEGATIVE
CHLORIDE SERPL-SCNC: 105 MMOL/L (ref 95–110)
CLARITY UR: CLEAR
CO2 SERPL-SCNC: 24 MMOL/L (ref 23–29)
COLOR UR: YELLOW
CREAT SERPL-MCNC: 0.8 MG/DL (ref 0.5–1.4)
CREAT UR-MCNC: 324 MG/DL (ref 23–375)
DIFFERENTIAL METHOD BLD: ABNORMAL
EOSINOPHIL # BLD AUTO: 0.4 K/UL (ref 0–0.5)
EOSINOPHIL NFR BLD: 3.9 % (ref 0–8)
ERYTHROCYTE [DISTWIDTH] IN BLOOD BY AUTOMATED COUNT: 13.5 % (ref 11.5–14.5)
EST. GFR  (NO RACE VARIABLE): >60 ML/MIN/1.73 M^2
ETHANOL SERPL-MCNC: <10 MG/DL
GLUCOSE SERPL-MCNC: 92 MG/DL (ref 70–110)
GLUCOSE UR QL STRIP: NEGATIVE
HCT VFR BLD AUTO: 35.8 % (ref 40–54)
HGB BLD-MCNC: 11.5 G/DL (ref 14–18)
HGB UR QL STRIP: NEGATIVE
IMM GRANULOCYTES # BLD AUTO: 0.03 K/UL (ref 0–0.04)
IMM GRANULOCYTES NFR BLD AUTO: 0.3 % (ref 0–0.5)
KETONES UR QL STRIP: NEGATIVE
LEUKOCYTE ESTERASE UR QL STRIP: NEGATIVE
LYMPHOCYTES # BLD AUTO: 3.4 K/UL (ref 1–4.8)
LYMPHOCYTES NFR BLD: 33.1 % (ref 18–48)
MCH RBC QN AUTO: 27.8 PG (ref 27–31)
MCHC RBC AUTO-ENTMCNC: 32.1 G/DL (ref 32–36)
MCV RBC AUTO: 87 FL (ref 82–98)
METHADONE UR QL SCN>300 NG/ML: NEGATIVE
MONOCYTES # BLD AUTO: 0.6 K/UL (ref 0.3–1)
MONOCYTES NFR BLD: 6 % (ref 4–15)
NEUTROPHILS # BLD AUTO: 5.8 K/UL (ref 1.8–7.7)
NEUTROPHILS NFR BLD: 56 % (ref 38–73)
NITRITE UR QL STRIP: NEGATIVE
NRBC BLD-RTO: 0 /100 WBC
OPIATES UR QL SCN: ABNORMAL
PCP UR QL SCN>25 NG/ML: NEGATIVE
PH UR STRIP: 6 [PH] (ref 5–8)
PLATELET # BLD AUTO: 267 K/UL (ref 150–450)
PMV BLD AUTO: 10.7 FL (ref 9.2–12.9)
POTASSIUM SERPL-SCNC: 4.3 MMOL/L (ref 3.5–5.1)
PROT SERPL-MCNC: 7.2 G/DL (ref 6–8.4)
PROT UR QL STRIP: ABNORMAL
RBC # BLD AUTO: 4.14 M/UL (ref 4.6–6.2)
SODIUM SERPL-SCNC: 138 MMOL/L (ref 136–145)
SP GR UR STRIP: >1.03 (ref 1–1.03)
TOXICOLOGY INFORMATION: ABNORMAL
TSH SERPL DL<=0.005 MIU/L-ACNC: 1.01 UIU/ML (ref 0.4–4)
URN SPEC COLLECT METH UR: ABNORMAL
UROBILINOGEN UR STRIP-ACNC: ABNORMAL EU/DL
WBC # BLD AUTO: 10.33 K/UL (ref 3.9–12.7)

## 2024-09-26 PROCEDURE — 63600175 PHARM REV CODE 636 W HCPCS: Performed by: EMERGENCY MEDICINE

## 2024-09-26 PROCEDURE — 81003 URINALYSIS AUTO W/O SCOPE: CPT | Performed by: EMERGENCY MEDICINE

## 2024-09-26 PROCEDURE — 80143 DRUG ASSAY ACETAMINOPHEN: CPT | Performed by: EMERGENCY MEDICINE

## 2024-09-26 PROCEDURE — 96372 THER/PROPH/DIAG INJ SC/IM: CPT | Performed by: EMERGENCY MEDICINE

## 2024-09-26 PROCEDURE — 80053 COMPREHEN METABOLIC PANEL: CPT | Performed by: EMERGENCY MEDICINE

## 2024-09-26 PROCEDURE — 84443 ASSAY THYROID STIM HORMONE: CPT | Performed by: EMERGENCY MEDICINE

## 2024-09-26 PROCEDURE — 85025 COMPLETE CBC W/AUTO DIFF WBC: CPT | Performed by: EMERGENCY MEDICINE

## 2024-09-26 PROCEDURE — 80307 DRUG TEST PRSMV CHEM ANLYZR: CPT | Performed by: EMERGENCY MEDICINE

## 2024-09-26 PROCEDURE — 99285 EMERGENCY DEPT VISIT HI MDM: CPT | Mod: 25

## 2024-09-26 PROCEDURE — 82077 ASSAY SPEC XCP UR&BREATH IA: CPT | Performed by: EMERGENCY MEDICINE

## 2024-09-26 RX ORDER — HALOPERIDOL 5 MG/ML
5 INJECTION INTRAMUSCULAR ONCE AS NEEDED
Status: COMPLETED | OUTPATIENT
Start: 2024-09-26 | End: 2024-09-26

## 2024-09-26 RX ORDER — LORAZEPAM 2 MG/ML
2 INJECTION INTRAMUSCULAR ONCE AS NEEDED
Status: COMPLETED | OUTPATIENT
Start: 2024-09-26 | End: 2024-09-26

## 2024-09-26 RX ADMIN — HALOPERIDOL LACTATE 5 MG: 5 INJECTION, SOLUTION INTRAMUSCULAR at 03:09

## 2024-09-26 RX ADMIN — LORAZEPAM 2 MG: 2 INJECTION INTRAMUSCULAR; INTRAVENOUS at 03:09

## 2024-09-26 NOTE — ED PROVIDER NOTES
"Encounter Date: 9/26/2024       History     Chief Complaint   Patient presents with    Psychiatric Evaluation     Pt BIB EMS, c/o "needing help getting off drugs". Pt denies any SI or HI. Pt denies any CP, SOB, or abd pain. Pt reports feeling nauseous. Pt denies any drug use today but EMS pt had an OD on yesterday and was administered Narcan.      Patient is a 44-year-old male with a past medical history of drug abuse who presents to the ED with complaints of "wanting help to get off drugs".  States that he was seen at [facility] ED for similar complaints.  At that time patient was placed on a PEC due to a high PHQ-9 screening.  During that interview patient, patient scored a 27 on the PHQ-9.  Upon evaluation here patient continues to deny suicidal or homicidal ideations.  Denies nausea, vomiting, fever, chills.        Review of patient's allergies indicates:  No Known Allergies  History reviewed. No pertinent past medical history.  History reviewed. No pertinent surgical history.  No family history on file.  Social History     Tobacco Use    Smoking status: Every Day     Types: Cigarettes    Smokeless tobacco: Never   Substance Use Topics    Alcohol use: Not Currently    Drug use: Yes     Frequency: 1.0 times per week     Types: Cocaine, Methamphetamines, Heroin     Comment: heroin(nasal)     Review of Systems   Constitutional:  Negative for chills and fever.   Respiratory:  Negative for chest tightness and shortness of breath.    Cardiovascular:  Negative for chest pain and leg swelling.   Gastrointestinal:  Positive for abdominal pain. Negative for nausea.   Neurological:  Negative for dizziness and weakness.   Psychiatric/Behavioral:  Positive for agitation. Negative for suicidal ideas.        Physical Exam     Initial Vitals [09/26/24 1405]   BP Pulse Resp Temp SpO2   116/80 88 20 97.7 °F (36.5 °C) 100 %      MAP       --         Physical Exam    Nursing note and vitals reviewed.  Constitutional: He " appears well-developed. He is not diaphoretic.   HENT:   Head: Normocephalic.   Right Ear: External ear normal.   Left Ear: External ear normal.   Eyes: Conjunctivae are normal. Right eye exhibits no discharge. Left eye exhibits no discharge.   Neck: No tracheal deviation present. No JVD present.   Cardiovascular:  Normal rate and regular rhythm.           No murmur heard.  Pulmonary/Chest: Breath sounds normal. No stridor. No respiratory distress. He has no wheezes. He has no rhonchi. He has no rales.   Abdominal: Abdomen is soft. He exhibits no distension.   Musculoskeletal:         General: Normal range of motion.     Neurological: He is alert and oriented to person, place, and time.   Skin: Skin is warm and dry.   Psychiatric: His mood appears anxious. His affect is angry. His speech is rapid and/or pressured. He is agitated.         ED Course   Critical Care    Date/Time: 9/26/2024 3:30 PM    Performed by: Dee Chakraborty PA-C  Authorized by: Manfred Reynolds MD  Total critical care time (exclusive of procedural time) : 0 minutes  Comments: Please put in 20 minutes of critical care due to patient having a high risk of respiratory neurological failure that needed sedation secondary to agitation.  Patient has a high resource utilizer needing nursing, ancillary staff, security to help with safety of patient, staff, and myself.   Separate from teaching and exclusive of procedure and ekg time  Includes:  Time at bedside  Time reviewing test results  Time discussing case with staff  Time documenting the medical record  Time spent with family members  Time spent with consults  Management          Labs Reviewed   CBC W/ AUTO DIFFERENTIAL - Abnormal       Result Value    WBC 10.33      RBC 4.14 (*)     Hemoglobin 11.5 (*)     Hematocrit 35.8 (*)     MCV 87      MCH 27.8      MCHC 32.1      RDW 13.5      Platelets 267      MPV 10.7      Immature Granulocytes 0.3      Gran # (ANC) 5.8      Immature Grans (Abs) 0.03       Lymph # 3.4      Mono # 0.6      Eos # 0.4      Baso # 0.07      nRBC 0      Gran % 56.0      Lymph % 33.1      Mono % 6.0      Eosinophil % 3.9      Basophil % 0.7      Differential Method Automated     URINALYSIS, REFLEX TO URINE CULTURE - Abnormal    Specimen UA Urine, Clean Catch      Color, UA Yellow      Appearance, UA Clear      pH, UA 6.0      Specific Gravity, UA >1.030 (*)     Protein, UA Trace (*)     Glucose, UA Negative      Ketones, UA Negative      Bilirubin (UA) Negative      Occult Blood UA Negative      Nitrite, UA Negative      Urobilinogen, UA 2.0-3.0 (*)     Leukocytes, UA Negative      Narrative:     Specimen Source->Urine   DRUG SCREEN PANEL, URINE EMERGENCY - Abnormal    Benzodiazepines Presumptive Positive (*)     Methadone metabolites Negative      Cocaine (Metab.) Presumptive Positive (*)     Opiate Scrn, Ur Presumptive Positive (*)     Barbiturate Screen, Ur Negative      Amphetamine Screen, Ur Presumptive Positive (*)     THC Negative      Phencyclidine Negative      Creatinine, Urine 324.0      Toxicology Information SEE COMMENT      Narrative:     Specimen Source->Urine   ACETAMINOPHEN LEVEL - Abnormal    Acetaminophen (Tylenol), Serum <3.0 (*)    COMPREHENSIVE METABOLIC PANEL    Sodium 138      Potassium 4.3      Chloride 105      CO2 24      Glucose 92      BUN 13      Creatinine 0.8      Calcium 9.1      Total Protein 7.2      Albumin 3.9      Total Bilirubin 0.5      Alkaline Phosphatase 90      AST 25      ALT 17      eGFR >60      Anion Gap 9     TSH    TSH 1.008     ALCOHOL,MEDICAL (ETHANOL)    Alcohol, Serum <10            Imaging Results    None          Medications   haloperidol lactate injection 5 mg (5 mg Intramuscular Given 9/26/24 1514)   LORazepam injection 2 mg (2 mg Intramuscular Given 9/26/24 1514)     Medical Decision Making  Ddx includes acute psychotic episode, SI/danger to self, HI/danger to others, but also toxidrome, withdrawal (eg from EtOH or BZD therapy),  electrolyte derangement, serotonin syndrome, unusual pathology like anti-NMDA receptor encephalitis, other.  Will get basic screening psychiatric labs on the patient and reassess.  Will sedate patient if necessary.  Patient is placed on a PEC due to being gravely disabled 2/2 severe depression.      Patient underwent a work up in the emergency department including labs, no acute organic cause of the patient's current psychiatric illness has been identified at this time. Patient medically cleared for psychiatric evaluation.  Patient cleared to transfer to psychiatric facility.    Amount and/or Complexity of Data Reviewed  Labs: ordered.    Risk  Prescription drug management.                  Medically cleared for psychiatry placement: 9/26/2024  4:30 PM                   Clinical Impression:  Final diagnoses:  [F32.A] Depression, unspecified depression type (Primary)  [T50.904A] Overdose of undetermined intent, initial encounter  [F19.10] Polysubstance abuse          ED Disposition Condition    Transfer to Psych Facility Stable          ED Prescriptions    None       Follow-up Information    None          Dee Chakraborty PA-C  09/26/24 8735

## 2024-09-26 NOTE — ED NOTES
PT to ED via EMS from [facility] with PEC in place. PEC states pt is gravely disabled due to depressed mood, not suicidal or homicidal.  Pt states he went to the clinic to find help detoxing from heroin. Pt denies SI/HI. Denies hallucinations.

## 2024-09-26 NOTE — ED NOTES
Pt pacing room. Stating he is going to leave. Security at bedside. Provider at bedside updating pt on PEC, all questions answered. Meds given with security in room.

## 2024-09-26 NOTE — ED NOTES
Jose Marshaac in waiting room, given update on PEC status. Number placed in chart. Will update with placement.

## 2024-09-27 VITALS
HEART RATE: 87 BPM | WEIGHT: 170 LBS | RESPIRATION RATE: 20 BRPM | OXYGEN SATURATION: 98 % | BODY MASS INDEX: 21.82 KG/M2 | TEMPERATURE: 97 F | DIASTOLIC BLOOD PRESSURE: 80 MMHG | SYSTOLIC BLOOD PRESSURE: 117 MMHG | HEIGHT: 74 IN

## 2024-09-27 NOTE — ED NOTES
Assumed care of pt under PEC order for being gravely disabled. Pt admits to heroin use and Narcan was admin to pt yesterday. Pt also wants to go to detox. Pt A/O x 4 w/ ABCs intact, NAD. VSS. Pt calm and cooperative.     Review of patient's allergies indicates:  No Known Allergies     Patient has verified the spelling of their name and  on armband.   APPEARANCE: Patient is alert, calm, oriented x 4, and does not appear distressed.  SKIN: Skin is normal for race, warm, and dry. Normal skin turgor and mucous membranes moist.  CARDIAC: Normal rate and rhythm, no murmur heard.   RESPIRATORY:Normal rate and effort. Breath sounds clear bilaterally throughout chest. Respirations are equal and unlabored.    GASTRO: Bowel sounds normal, abdomen is soft, no tenderness, and no abdominal distention.  MUSCLE: Full ROM. No bony tenderness or soft tissue tenderness. No obvious deformity.  PERIPHERAL VASCULAR: peripheral pulses present. Normal cap refill. No edema. Warm to touch.  NEURO: 5/5 strength major flexors/extensors bilaterally. Sensory intact to light touch bilaterally. Jose coma scale: eyes open spontaneously-4, oriented & converses-5, obeys commands-6. No neurological abnormalities.   MENTAL STATUS: awake, alert and aware of environment.  : Voids without complication    Sitter at bedside.

## 2024-09-27 NOTE — ED NOTES
I spoke with Charissa at [facility] and informed her that the patient is en route to [facility]

## 2024-09-28 PROBLEM — D64.9 ANEMIA: Status: ACTIVE | Noted: 2024-09-28

## 2024-09-28 PROBLEM — F19.10 POLYSUBSTANCE ABUSE: Status: ACTIVE | Noted: 2024-09-28

## 2024-09-28 PROBLEM — Z13.9 ENCOUNTER FOR MEDICAL SCREENING EXAMINATION: Status: ACTIVE | Noted: 2024-09-28

## 2025-07-04 ENCOUNTER — HOSPITAL ENCOUNTER (EMERGENCY)
Facility: HOSPITAL | Age: 45
Discharge: OTHER INSTITUTION | End: 2025-07-04
Attending: STUDENT IN AN ORGANIZED HEALTH CARE EDUCATION/TRAINING PROGRAM
Payer: COMMERCIAL

## 2025-07-04 VITALS
BODY MASS INDEX: 21.82 KG/M2 | DIASTOLIC BLOOD PRESSURE: 67 MMHG | HEIGHT: 74 IN | WEIGHT: 170 LBS | TEMPERATURE: 98 F | RESPIRATION RATE: 20 BRPM | SYSTOLIC BLOOD PRESSURE: 95 MMHG | OXYGEN SATURATION: 98 % | HEART RATE: 82 BPM

## 2025-07-04 DIAGNOSIS — M00.9 PYOGENIC ARTHRITIS OF RIGHT HAND, DUE TO UNSPECIFIED ORGANISM: Primary | ICD-10-CM

## 2025-07-04 DIAGNOSIS — Z13.6 SCREENING FOR CARDIOVASCULAR CONDITION: ICD-10-CM

## 2025-07-04 DIAGNOSIS — M79.639 FOREARM PAIN: ICD-10-CM

## 2025-07-04 LAB
ABSOLUTE EOSINOPHIL (OHS): 0.14 K/UL
ABSOLUTE MONOCYTE (OHS): 1.01 K/UL (ref 0.3–1)
ABSOLUTE NEUTROPHIL COUNT (OHS): 10.46 K/UL (ref 1.8–7.7)
ALBUMIN SERPL BCP-MCNC: 3.8 G/DL (ref 3.5–5.2)
ALLENS TEST: NORMAL
ALP SERPL-CCNC: 88 UNIT/L (ref 40–150)
ALT SERPL W/O P-5'-P-CCNC: 23 UNIT/L (ref 10–44)
ANION GAP (OHS): 11 MMOL/L (ref 8–16)
AST SERPL-CCNC: 23 UNIT/L (ref 11–45)
BASOPHILS # BLD AUTO: 0.04 K/UL
BASOPHILS NFR BLD AUTO: 0.3 %
BILIRUB SERPL-MCNC: 0.3 MG/DL (ref 0.1–1)
BUN SERPL-MCNC: 13 MG/DL (ref 6–20)
CALCIUM SERPL-MCNC: 8.9 MG/DL (ref 8.7–10.5)
CHLORIDE SERPL-SCNC: 105 MMOL/L (ref 95–110)
CO2 SERPL-SCNC: 23 MMOL/L (ref 23–29)
CREAT SERPL-MCNC: 0.8 MG/DL (ref 0.5–1.4)
ERYTHROCYTE [DISTWIDTH] IN BLOOD BY AUTOMATED COUNT: 13.5 % (ref 11.5–14.5)
GFR SERPLBLD CREATININE-BSD FMLA CKD-EPI: >60 ML/MIN/1.73/M2
GLUCOSE SERPL-MCNC: 125 MG/DL (ref 70–110)
HCT VFR BLD AUTO: 40 % (ref 40–54)
HGB BLD-MCNC: 12.8 GM/DL (ref 14–18)
IMM GRANULOCYTES # BLD AUTO: 0.04 K/UL (ref 0–0.04)
IMM GRANULOCYTES NFR BLD AUTO: 0.3 % (ref 0–0.5)
LACTATE SERPL-SCNC: 0.9 MMOL/L (ref 0.5–2.2)
LACTATE SERPL-SCNC: 1.2 MMOL/L (ref 0.5–2.2)
LDH SERPL L TO P-CCNC: 0.99 MMOL/L (ref 0.5–2.2)
LYMPHOCYTES # BLD AUTO: 2.13 K/UL (ref 1–4.8)
MCH RBC QN AUTO: 27.2 PG (ref 27–31)
MCHC RBC AUTO-ENTMCNC: 32 G/DL (ref 32–36)
MCV RBC AUTO: 85 FL (ref 82–98)
NUCLEATED RBC (/100WBC) (OHS): 0 /100 WBC
PLATELET # BLD AUTO: 268 K/UL (ref 150–450)
PMV BLD AUTO: 11.3 FL (ref 9.2–12.9)
POTASSIUM SERPL-SCNC: 4.4 MMOL/L (ref 3.5–5.1)
PROT SERPL-MCNC: 7.9 GM/DL (ref 6–8.4)
RBC # BLD AUTO: 4.71 M/UL (ref 4.6–6.2)
RELATIVE EOSINOPHIL (OHS): 1 %
RELATIVE LYMPHOCYTE (OHS): 15.4 % (ref 18–48)
RELATIVE MONOCYTE (OHS): 7.3 % (ref 4–15)
RELATIVE NEUTROPHIL (OHS): 75.7 % (ref 38–73)
SAMPLE: NORMAL
SITE: NORMAL
SODIUM SERPL-SCNC: 139 MMOL/L (ref 136–145)
WBC # BLD AUTO: 13.82 K/UL (ref 3.9–12.7)

## 2025-07-04 PROCEDURE — 96375 TX/PRO/DX INJ NEW DRUG ADDON: CPT

## 2025-07-04 PROCEDURE — 25000003 PHARM REV CODE 250: Performed by: STUDENT IN AN ORGANIZED HEALTH CARE EDUCATION/TRAINING PROGRAM

## 2025-07-04 PROCEDURE — 96366 THER/PROPH/DIAG IV INF ADDON: CPT

## 2025-07-04 PROCEDURE — 96361 HYDRATE IV INFUSION ADD-ON: CPT

## 2025-07-04 PROCEDURE — 99291 CRITICAL CARE FIRST HOUR: CPT

## 2025-07-04 PROCEDURE — 80053 COMPREHEN METABOLIC PANEL: CPT | Performed by: STUDENT IN AN ORGANIZED HEALTH CARE EDUCATION/TRAINING PROGRAM

## 2025-07-04 PROCEDURE — 99900035 HC TECH TIME PER 15 MIN (STAT)

## 2025-07-04 PROCEDURE — 96365 THER/PROPH/DIAG IV INF INIT: CPT

## 2025-07-04 PROCEDURE — 25500020 PHARM REV CODE 255: Performed by: STUDENT IN AN ORGANIZED HEALTH CARE EDUCATION/TRAINING PROGRAM

## 2025-07-04 PROCEDURE — 93005 ELECTROCARDIOGRAM TRACING: CPT

## 2025-07-04 PROCEDURE — 96367 TX/PROPH/DG ADDL SEQ IV INF: CPT

## 2025-07-04 PROCEDURE — 63600175 PHARM REV CODE 636 W HCPCS: Performed by: STUDENT IN AN ORGANIZED HEALTH CARE EDUCATION/TRAINING PROGRAM

## 2025-07-04 PROCEDURE — 83605 ASSAY OF LACTIC ACID: CPT

## 2025-07-04 PROCEDURE — 87040 BLOOD CULTURE FOR BACTERIA: CPT | Performed by: STUDENT IN AN ORGANIZED HEALTH CARE EDUCATION/TRAINING PROGRAM

## 2025-07-04 PROCEDURE — 83605 ASSAY OF LACTIC ACID: CPT | Performed by: STUDENT IN AN ORGANIZED HEALTH CARE EDUCATION/TRAINING PROGRAM

## 2025-07-04 PROCEDURE — 93010 ELECTROCARDIOGRAM REPORT: CPT | Mod: ,,, | Performed by: INTERNAL MEDICINE

## 2025-07-04 PROCEDURE — 85025 COMPLETE CBC W/AUTO DIFF WBC: CPT | Performed by: STUDENT IN AN ORGANIZED HEALTH CARE EDUCATION/TRAINING PROGRAM

## 2025-07-04 RX ORDER — MORPHINE SULFATE 4 MG/ML
6 INJECTION, SOLUTION INTRAMUSCULAR; INTRAVENOUS
Refills: 0 | Status: COMPLETED | OUTPATIENT
Start: 2025-07-04 | End: 2025-07-04

## 2025-07-04 RX ADMIN — SODIUM CHLORIDE, POTASSIUM CHLORIDE, SODIUM LACTATE AND CALCIUM CHLORIDE 1000 ML: 600; 310; 30; 20 INJECTION, SOLUTION INTRAVENOUS at 04:07

## 2025-07-04 RX ADMIN — PIPERACILLIN AND TAZOBACTAM 4.5 G: 4; .5 INJECTION, POWDER, LYOPHILIZED, FOR SOLUTION INTRAVENOUS; PARENTERAL at 04:07

## 2025-07-04 RX ADMIN — VANCOMYCIN HYDROCHLORIDE 2000 MG: 2 INJECTION, POWDER, LYOPHILIZED, FOR SOLUTION INTRAVENOUS at 05:07

## 2025-07-04 RX ADMIN — MORPHINE SULFATE 6 MG: 4 INJECTION INTRAVENOUS at 04:07

## 2025-07-04 RX ADMIN — SODIUM CHLORIDE, POTASSIUM CHLORIDE, SODIUM LACTATE AND CALCIUM CHLORIDE 1000 ML: 600; 310; 30; 20 INJECTION, SOLUTION INTRAVENOUS at 03:07

## 2025-07-04 RX ADMIN — IOHEXOL 85 ML: 350 INJECTION, SOLUTION INTRAVENOUS at 06:07

## 2025-07-04 NOTE — ED TRIAGE NOTES
Pt BIB [facility] for possible cellulitis to right arm.  Pt reports he was treated for a skin infection recently but over the past 2 days it's gotten worse.  Hx: HTN

## 2025-07-04 NOTE — ED NOTES
Pt in bed AAOx4. BP decreased post Morphine administration but is trending up with IV fluids infusing. Pt denies any symptoms. Dr. DUDLEY Mason aware.

## 2025-07-04 NOTE — ED PROVIDER NOTES
Encounter Date: 7/4/2025       History     Chief Complaint   Patient presents with    Arm Swelling     Pt arrived via ems, pt chief complaint is Arm swelling. 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       Patient is a 45-year-old with no past medical history who presents with right hand pain.  Patient states he got a cut about 3 weeks ago while helping his friend with his roof.  He states he cut his hand on tin aaron material.  He went to [facility] where they gave him a tetanus and glued his cut shut.  He did well initially however a few days ago he started to have right hand swelling and pain.  He went to [facility] again where he had a workup done and was given doxycycline, narcotic pain medication and was discharged.  His hand swelling has continued to worsen.  He endorses subjective chills but no fevers.  Denies chest pain, shortness of breath, N/V/D.  He has been, with his doxycycline, last dose this morning.  He states his pain has been unrelieved with the pain medication.  He states that [facility] him in told him at his most recent visit to come to the emergency room here for further evaluation but the patient did not come until today.    The history is provided by the patient. No  was used.     Review of patient's allergies indicates:  No Known Allergies  History reviewed. No pertinent past medical history.  History reviewed. No pertinent surgical history.  No family history on file.  Social History[1]  Review of Systems   Constitutional:  Positive for chills. Negative for fever.   HENT:  Negative for sore throat.    Respiratory:  Negative for shortness of breath.    Cardiovascular:  Negative for chest pain.   Gastrointestinal:  Negative for nausea.   Genitourinary:  Negative for dysuria.   Musculoskeletal:  Negative for back pain.   Skin:  Negative for rash.   Neurological:   Negative for weakness.       Physical Exam     Initial Vitals [07/04/25 1530]   BP Pulse Resp Temp SpO2   107/60 (!) 115 16 98.3 °F (36.8 °C) 98 %      MAP       --         Physical Exam    Constitutional: Vital signs are normal. No distress.   HENT:   Head: Normocephalic and atraumatic.   Eyes: EOM are normal.   Neck:   Normal range of motion.  Cardiovascular:  Normal rate, regular rhythm and intact distal pulses.           Pulmonary/Chest: Breath sounds normal. No respiratory distress.   Abdominal: Abdomen is soft. There is no abdominal tenderness.   Musculoskeletal:         General: Tenderness (Tenderness to right hand and right forearm) and edema (Right hand and right forearm) present. Normal range of motion.      Cervical back: Normal range of motion.     Neurological: He is alert and oriented to person, place, and time. GCS score is 15. GCS eye subscore is 4. GCS verbal subscore is 5. GCS motor subscore is 6.   Skin: Skin is warm and dry.   Right hand right forearm significantly erythematous, no crepitus.  Wound to dorsum of right hand       ED Course   Critical Care    Date/Time: 7/4/2025 3:50 PM    Performed by: Scarlet Mason MD  Authorized by: Scarlet Mason MD  Direct patient critical care time: 12 minutes  Ordering / reviewing critical care time: 10 minutes  Documentation critical care time: 5 minutes  Consulting other physicians critical care time: 12 minutes  Total critical care time (exclusive of procedural time) : 39 minutes  Critical care time was exclusive of separately billable procedures and treating other patients.  Critical care was necessary to treat or prevent imminent or life-threatening deterioration of the following conditions: sepsis.        Labs Reviewed   COMPREHENSIVE METABOLIC PANEL - Abnormal       Result Value    Sodium 139      Potassium 4.4      Chloride 105      CO2 23      Glucose 125 (*)     BUN 13      Creatinine 0.8      Calcium 8.9      Protein Total 7.9       Albumin 3.8      Bilirubin Total 0.3      ALP 88      AST 23      ALT 23      Anion Gap 11      eGFR >60     CBC WITH DIFFERENTIAL - Abnormal    WBC 13.82 (*)     RBC 4.71      HGB 12.8 (*)     HCT 40.0      MCV 85      MCH 27.2      MCHC 32.0      RDW 13.5      Platelet Count 268      MPV 11.3      Nucleated RBC 0      Neut % 75.7 (*)     Lymph % 15.4 (*)     Mono % 7.3      Eos % 1.0      Basophil % 0.3      Imm Grans % 0.3      Neut # 10.46 (*)     Lymph # 2.13      Mono # 1.01 (*)     Eos # 0.14      Baso # 0.04      Imm Grans # 0.04     LACTIC ACID, PLASMA - Normal    Lactic Acid Level 1.2      Narrative:     Falsely low lactic acid results can be found in samples containing >=13.0 mg/dL total bilirubin and/or >=3.5 mg/dL direct bilirubin.    CULTURE, BLOOD   CULTURE, BLOOD   CBC W/ AUTO DIFFERENTIAL    Narrative:     The following orders were created for panel order CBC auto differential.  Procedure                               Abnormality         Status                     ---------                               -----------         ------                     CBC with Differential[[id number]]       Abnormal            Final result                 Please view results for these tests on the individual orders.   LACTIC ACID, PLASMA   ISTAT LACTATE    POC Lactate 0.99      Sample VENOUS      Site Other      Allens Test N/A       EKG Readings: (Independently Interpreted)   Normal sinus rhythm, rate 86, right axis deviation, normal intervals, no change from prior EKGs       Imaging Results               CT Hand With Contrast Right (Final result)  Result time 07/04/25 18:39:03      Final result by Goldy Alberto MD (07/04/25 18:39:03)                   Impression:      Enhancing fluid collection in the dorsum of the hand at the level of the middle finger distal metacarpal head.  This may represent an infected MCP joint with extension into the dorsum of the hand.  Correlate for septic joint.    Dorsal hand and  forearm edema compatible with cellulitis.    No other compartment involvement evident.    This report was flagged in Epic as abnormal.      Electronically signed by: Goldy Alberto  Date:    07/04/2025  Time:    18:39               Narrative:    EXAMINATION:  CT HAND WITH CONTRAST RIGHT    CLINICAL HISTORY:  Soft tissue infection suspected, hand, xray done;    TECHNIQUE:  Contrast-enhanced CT of the right hand was performed following the administration of 85 cc of Omnipaque 350 given for CT of right forearm.    COMPARISON:  Plain x-ray, same day    FINDINGS:  There is subcutaneous edema in the fingers especially on the dorsum of the hand extending to the medial forearm.    There is a focal rim enhancing fluid collection emanating from the middle fingers MCP on the dorsum of the hand.  The region measures approximately 16 x 13 by 21 mm.  No bone erosion is evident.    No intrinsic muscle fluid collection or enhancement.  Carpal tunnel and extensor compartments appear otherwise intact.  Dorsal and palmar vascular arches appear intact.  No bone erosion.                                       CT Forearm (Radius/Ulna) With Contrast Right (Final result)  Result time 07/04/25 18:31:15      Final result by Goldy Alberto MD (07/04/25 18:31:15)                   Impression:      Subcutaneous edema along the medial aspect of the right forearm.  Correlate for cellulitis.    No evidence of drainable abscess or muscular or bone involvement..      Electronically signed by: Goldy Alberto  Date:    07/04/2025  Time:    18:31               Narrative:    EXAMINATION:  CT FOREARM (RADIUS/ULNA) WITH CONTRAST RIGHT    CLINICAL HISTORY:  Soft tissue infection suspected, forearm, xray done;    TECHNIQUE:  CT of the right forearm was performed following the administration of 85 cc of Omnipaque 350 contrast medium.  Sagittal and coronal reformatted images or constructed and presented for interpretation.    COMPARISON:  Plain x-ray,  07/04/2025 at 16:17 hours    FINDINGS:  Subcutaneous edema is noted along the medial aspect of the forearm.  No rim enhancing abscess or drainable fluid collection.  The anterior and posterior muscles appear normal with no abnormal enhancement.    The subcutaneous veins and radial and ulnar artery appear unremarkable.    Bones appear intact with no bony destructive process or periosteal reaction.  Visualized elbow and wrist unremarkable.                                       X-Ray Hand 3 view Right (Final result)  Result time 07/04/25 17:30:57      Final result by Valentine Khan MD (07/04/25 17:30:57)                   Impression:      No acute bony abnormality detected.      Electronically signed by: Valentine Khan  Date:    07/04/2025  Time:    17:30               Narrative:    EXAMINATION:  TWO VIEWS OF THE RIGHT FOREARM AND THREE VIEWS OF THE RIGHT HAND    CLINICAL HISTORY:  Pain in unspecified forearmhand infection;    TECHNIQUE:  AP and lateral view of the right forearm    COMPARISON:  <None.>    FINDINGS:  Two views of the right forearm demonstrate no acute fracture or dislocation.    There is soft tissue swelling present.  Three views of the right hand demonstrate no acute fracture or dislocation.  Radiopaque foreign body is detected.  There is a pulse ox on the 5th digit.                                       X-Ray Forearm Right (Final result)  Result time 07/04/25 17:30:57      Final result by Valentine Khan MD (07/04/25 17:30:57)                   Impression:      No acute bony abnormality detected.      Electronically signed by: Valentine Khan  Date:    07/04/2025  Time:    17:30               Narrative:    EXAMINATION:  TWO VIEWS OF THE RIGHT FOREARM AND THREE VIEWS OF THE RIGHT HAND    CLINICAL HISTORY:  Pain in unspecified forearmhand infection;    TECHNIQUE:  AP and lateral view of the right forearm    COMPARISON:  <None.>    FINDINGS:  Two views of the right forearm demonstrate no  acute fracture or dislocation.    There is soft tissue swelling present.  Three views of the right hand demonstrate no acute fracture or dislocation.  Radiopaque foreign body is detected.  There is a pulse ox on the 5th digit.                                       Medications   piperacillin-tazobactam (ZOSYN) 4.5 g in D5W 100 mL IVPB (MB+) (has no administration in time range)   lactated ringers bolus 1,000 mL (0 mLs Intravenous Stopped 7/4/25 1658)   vancomycin 2,000 mg in 0.9% NaCl 500 mL IVPB (admixture device) (2,000 mg Intravenous New Bag 7/4/25 1701)   morphine injection 6 mg (6 mg Intravenous Given by Other 7/4/25 1602)   piperacillin-tazobactam (ZOSYN) 4.5 g in D5W 100 mL IVPB (MB+) (0 g Intravenous Stopped 7/4/25 1632)   lactated ringers bolus 1,000 mL (0 mLs Intravenous Stopped 7/4/25 1927)   iohexoL (OMNIPAQUE 350) injection 85 mL (85 mLs Intravenous Given 7/4/25 1814)     Medical Decision Making  Carlos Choudhury is a 45 y.o. male with no PMH who presents to the ED with right hand and forearm pain, erythema, swelling and subjective chills for 2-3 days despite outpatient antibiotics.    Initial vitals notable for tachycardia. Physical exam reveals right hand and forearm swelling, erythema and tenderness.     Given infectious source, tachycardia, highly concerned patient is septic. Ddx includes but is not limited to cellulitis, septic arthritis, abscess, necrotizing soft tissue infection. Given history, physical exam, vital signs and chart review, there is low concern for acute fracture, dislocation, septic shock.     I will obtain the following to better assess:  CBC, CMP, lactic acid, blood cultures, x-ray of right hand and forearm, CT scan of right hand more. Immediate interventions include LR, morphine, broad-spectrum antibiotics with the vanc and Zosyn.       Amount and/or Complexity of Data Reviewed  Labs: ordered.  Radiology: ordered.    Risk  Prescription drug management.  Decision regarding  hospitalization.               ED Course as of 07/04/25 1941 Fri Jul 04, 2025   1653 Patient with slight hypotension after morphine.  I have ordered additional L of fluids.  He is awake, alert and responsive.  Denies additional opioid use in addition to the morphine.  His map is greater than 65.  We will continue to monitor [KI]   1738 X-rays with soft tissue swelling without gas [KI]   1840 Per Radiology, CT of the hand concern for possible septic joint.  Can not see the images currently, we will trouble shoot and discuss with orthopedics [KI]   1852 Dr. Saavedra recommends transfer to [facility] for hand surgery evaluation [KI]   1900 Per transport center, there are no hand surgeons on-call this weekend, they are evaluating transferring to [facility] system [KI]   1937 Spoke with [facility] ED attending, Dr. Sung, he has been accepted for transfer [KI]      ED Course User Index  [KI] Scarlet Mason MD                           Clinical Impression:  Final diagnoses:  [Z13.6] Screening for cardiovascular condition  [M79.639] Forearm pain  [M00.9] Pyogenic arthritis of right hand, due to unspecified organism (Primary)          ED Disposition Condition    Transfer to Another Facility Stable                      [1]   Social History  Tobacco Use    Smoking status: Every Day     Types: Cigarettes    Smokeless tobacco: Never   Substance Use Topics    Alcohol use: Not Currently    Drug use: Yes     Frequency: 1.0 times per week     Types: Cocaine, Methamphetamines, Heroin     Comment: heroin(nasal)        Scarlet Mason MD  07/04/25 2049

## 2025-07-05 LAB
OHS QRS DURATION: 84 MS
OHS QTC CALCULATION: 419 MS

## 2025-07-09 LAB
BACTERIA BLD CULT: NORMAL
BACTERIA BLD CULT: NORMAL

## 2025-07-22 ENCOUNTER — HOSPITAL ENCOUNTER (EMERGENCY)
Facility: HOSPITAL | Age: 45
Discharge: ANOTHER HOSPITAL | End: 2025-07-22
Attending: EMERGENCY MEDICINE
Payer: COMMERCIAL

## 2025-07-22 VITALS
WEIGHT: 180 LBS | HEART RATE: 102 BPM | SYSTOLIC BLOOD PRESSURE: 116 MMHG | BODY MASS INDEX: 23.1 KG/M2 | OXYGEN SATURATION: 99 % | TEMPERATURE: 98 F | HEIGHT: 74 IN | RESPIRATION RATE: 14 BRPM | DIASTOLIC BLOOD PRESSURE: 70 MMHG

## 2025-07-22 DIAGNOSIS — Z13.6 SCREENING FOR CARDIOVASCULAR CONDITION: ICD-10-CM

## 2025-07-22 DIAGNOSIS — T81.40XA POSTOPERATIVE INFECTION, UNSPECIFIED TYPE, INITIAL ENCOUNTER: Primary | ICD-10-CM

## 2025-07-22 DIAGNOSIS — M00.9 SEPTIC ARTHRITIS, DUE TO UNSPECIFIED ORGANISM, SEPTIC ARTHRITIS OF UNSPECIFIED LOCATION: ICD-10-CM

## 2025-07-22 LAB
ABSOLUTE EOSINOPHIL (OHS): 0.05 K/UL
ABSOLUTE MONOCYTE (OHS): 0.35 K/UL (ref 0.3–1)
ABSOLUTE NEUTROPHIL COUNT (OHS): 6.66 K/UL (ref 1.8–7.7)
ALBUMIN SERPL BCP-MCNC: 4.1 G/DL (ref 3.5–5.2)
ALLENS TEST: NORMAL
ALP SERPL-CCNC: 84 UNIT/L (ref 40–150)
ALT SERPL W/O P-5'-P-CCNC: 19 UNIT/L (ref 10–44)
ANION GAP (OHS): 15 MMOL/L (ref 8–16)
AST SERPL-CCNC: 27 UNIT/L (ref 11–45)
BASOPHILS # BLD AUTO: 0.05 K/UL
BASOPHILS NFR BLD AUTO: 0.5 %
BILIRUB SERPL-MCNC: 0.3 MG/DL (ref 0.1–1)
BILIRUB UR QL STRIP.AUTO: NEGATIVE
BUN SERPL-MCNC: 11 MG/DL (ref 6–20)
CALCIUM SERPL-MCNC: 9.3 MG/DL (ref 8.7–10.5)
CHLORIDE SERPL-SCNC: 105 MMOL/L (ref 95–110)
CLARITY UR: CLEAR
CO2 SERPL-SCNC: 23 MMOL/L (ref 23–29)
COLOR UR AUTO: YELLOW
CREAT SERPL-MCNC: 0.8 MG/DL (ref 0.5–1.4)
CRP SERPL-MCNC: 1 MG/L
ERYTHROCYTE [DISTWIDTH] IN BLOOD BY AUTOMATED COUNT: 13.5 % (ref 11.5–14.5)
GFR SERPLBLD CREATININE-BSD FMLA CKD-EPI: >60 ML/MIN/1.73/M2
GLUCOSE SERPL-MCNC: 106 MG/DL (ref 70–110)
GLUCOSE UR QL STRIP: NEGATIVE
HCT VFR BLD AUTO: 38.7 % (ref 40–54)
HGB BLD-MCNC: 12.2 GM/DL (ref 14–18)
HGB UR QL STRIP: NEGATIVE
IMM GRANULOCYTES # BLD AUTO: 0.02 K/UL (ref 0–0.04)
IMM GRANULOCYTES NFR BLD AUTO: 0.2 % (ref 0–0.5)
KETONES UR QL STRIP: NEGATIVE
LACTATE SERPL-SCNC: 1.1 MMOL/L (ref 0.5–2.2)
LDH SERPL L TO P-CCNC: 1.48 MMOL/L (ref 0.5–2.2)
LEUKOCYTE ESTERASE UR QL STRIP: NEGATIVE
LYMPHOCYTES # BLD AUTO: 2.08 K/UL (ref 1–4.8)
MCH RBC QN AUTO: 26.9 PG (ref 27–31)
MCHC RBC AUTO-ENTMCNC: 31.5 G/DL (ref 32–36)
MCV RBC AUTO: 85 FL (ref 82–98)
NITRITE UR QL STRIP: NEGATIVE
NUCLEATED RBC (/100WBC) (OHS): 0 /100 WBC
PH UR STRIP: 8 [PH]
PLATELET # BLD AUTO: 255 K/UL (ref 150–450)
PMV BLD AUTO: 11.7 FL (ref 9.2–12.9)
POTASSIUM SERPL-SCNC: 4.1 MMOL/L (ref 3.5–5.1)
PROCALCITONIN SERPL-MCNC: <0.02 NG/ML
PROT SERPL-MCNC: 8.3 GM/DL (ref 6–8.4)
PROT UR QL STRIP: ABNORMAL
RBC # BLD AUTO: 4.54 M/UL (ref 4.6–6.2)
RELATIVE EOSINOPHIL (OHS): 0.5 %
RELATIVE LYMPHOCYTE (OHS): 22.6 % (ref 18–48)
RELATIVE MONOCYTE (OHS): 3.8 % (ref 4–15)
RELATIVE NEUTROPHIL (OHS): 72.4 % (ref 38–73)
SAMPLE: NORMAL
SITE: NORMAL
SODIUM SERPL-SCNC: 143 MMOL/L (ref 136–145)
SP GR UR STRIP: >=1.03
UROBILINOGEN UR STRIP-ACNC: NEGATIVE EU/DL
WBC # BLD AUTO: 9.21 K/UL (ref 3.9–12.7)

## 2025-07-22 PROCEDURE — 63600175 PHARM REV CODE 636 W HCPCS: Performed by: EMERGENCY MEDICINE

## 2025-07-22 PROCEDURE — 96367 TX/PROPH/DG ADDL SEQ IV INF: CPT

## 2025-07-22 PROCEDURE — 83605 ASSAY OF LACTIC ACID: CPT

## 2025-07-22 PROCEDURE — 25500020 PHARM REV CODE 255: Performed by: EMERGENCY MEDICINE

## 2025-07-22 PROCEDURE — 99285 EMERGENCY DEPT VISIT HI MDM: CPT | Mod: 25

## 2025-07-22 PROCEDURE — 25000003 PHARM REV CODE 250

## 2025-07-22 PROCEDURE — 63600175 PHARM REV CODE 636 W HCPCS: Mod: JZ,TB

## 2025-07-22 PROCEDURE — 99900035 HC TECH TIME PER 15 MIN (STAT)

## 2025-07-22 PROCEDURE — 25000003 PHARM REV CODE 250: Performed by: EMERGENCY MEDICINE

## 2025-07-22 PROCEDURE — 85025 COMPLETE CBC W/AUTO DIFF WBC: CPT

## 2025-07-22 PROCEDURE — 81003 URINALYSIS AUTO W/O SCOPE: CPT

## 2025-07-22 PROCEDURE — 96375 TX/PRO/DX INJ NEW DRUG ADDON: CPT

## 2025-07-22 PROCEDURE — 93005 ELECTROCARDIOGRAM TRACING: CPT

## 2025-07-22 PROCEDURE — 96365 THER/PROPH/DIAG IV INF INIT: CPT

## 2025-07-22 PROCEDURE — 93010 ELECTROCARDIOGRAM REPORT: CPT | Mod: ,,, | Performed by: INTERNAL MEDICINE

## 2025-07-22 PROCEDURE — 80053 COMPREHEN METABOLIC PANEL: CPT

## 2025-07-22 PROCEDURE — 84145 PROCALCITONIN (PCT): CPT

## 2025-07-22 PROCEDURE — 87040 BLOOD CULTURE FOR BACTERIA: CPT

## 2025-07-22 PROCEDURE — 96366 THER/PROPH/DIAG IV INF ADDON: CPT

## 2025-07-22 PROCEDURE — 86140 C-REACTIVE PROTEIN: CPT | Performed by: EMERGENCY MEDICINE

## 2025-07-22 RX ORDER — KETOROLAC TROMETHAMINE 30 MG/ML
15 INJECTION, SOLUTION INTRAMUSCULAR; INTRAVENOUS EVERY 6 HOURS PRN
Status: DISCONTINUED | OUTPATIENT
Start: 2025-07-22 | End: 2025-07-23 | Stop reason: HOSPADM

## 2025-07-22 RX ORDER — ACETAMINOPHEN 325 MG/1
650 TABLET ORAL EVERY 6 HOURS PRN
Status: DISCONTINUED | OUTPATIENT
Start: 2025-07-22 | End: 2025-07-23 | Stop reason: HOSPADM

## 2025-07-22 RX ORDER — KETOROLAC TROMETHAMINE 30 MG/ML
15 INJECTION, SOLUTION INTRAMUSCULAR; INTRAVENOUS
Status: COMPLETED | OUTPATIENT
Start: 2025-07-22 | End: 2025-07-22

## 2025-07-22 RX ADMIN — IOHEXOL 75 ML: 350 INJECTION, SOLUTION INTRAVENOUS at 03:07

## 2025-07-22 RX ADMIN — KETOROLAC TROMETHAMINE 15 MG: 30 INJECTION, SOLUTION INTRAMUSCULAR; INTRAVENOUS at 01:07

## 2025-07-22 RX ADMIN — PIPERACILLIN SODIUM AND TAZOBACTAM SODIUM 4.5 G: 4; .5 INJECTION, POWDER, FOR SOLUTION INTRAVENOUS at 12:07

## 2025-07-22 RX ADMIN — VANCOMYCIN HYDROCHLORIDE 1750 MG: 1.75 INJECTION, POWDER, LYOPHILIZED, FOR SOLUTION INTRAVENOUS at 01:07

## 2025-07-22 NOTE — ED NOTES
Carlos Choudhury, a 45 y.o. male presents to the ED w/ complaint of right dorsal hand pain and swelling r/t surgical procedure performed at [facility] 2 weeks ago. Patient is AAOx3, VSS, NAD. Denies CP, SOB, N/V/D/C, cough, fever, numbness, or tingling. Bed locked, in lowest position, bed rails up x2, call light in reach, all monitoring attached.    Triage note:  Chief Complaint   Patient presents with    Hand Pain     Pt to ED today with R hand pain after having surgery at [facility] for septic arthritis 2 weeks ago, pts hand is swollen and looks to be infected.      Review of patient's allergies indicates:  No Known Allergies  No past medical history on file.

## 2025-07-22 NOTE — PROGRESS NOTES
"Pharmacokinetic Initial Assessment: IV Vancomycin    Assessment/Plan:    Initiate intravenous vancomycin with loading dose of 1750 mg once followed by a maintenance dose of vancomycin 1500 mg IV every 12 hours  Desired empiric serum trough concentration is 10 to 20 mcg/mL  Draw vancomycin trough level 60 min prior to fourth dose on 7/24 at approximately 0000  Pharmacy will continue to follow and monitor vancomycin.      Please contact pharmacy at extension [phone] with any questions regarding this assessment.     Thank you for the consult,   Norberto JUANJO Edu       Patient brief summary:  Carlos Choudhury is a 45 y.o. male initiated on antimicrobial therapy with IV Vancomycin for treatment of suspected sepsis    Drug Allergies:   Review of patient's allergies indicates:  No Known Allergies    Actual Body Weight:   82 kg    Renal Function:   Estimated Creatinine Clearance: 134.6 mL/min (based on SCr of 0.8 mg/dL).,     Dialysis Method (if applicable):  N/A    CBC (last 72 hours):  Recent Labs   Lab Result Units 07/22/25  1215   WBC K/uL 9.21   HGB gm/dL 12.2*   HCT % 38.7*   Platelet Count K/uL 255   Lymph % % 22.6   Mono % % 3.8*   Eos % % 0.5   Basophil % % 0.5       Metabolic Panel (last 72 hours):  Recent Labs   Lab Result Units 07/22/25  1215   Sodium mmol/L 143   Potassium mmol/L 4.1   Chloride mmol/L 105   CO2 mmol/L 23   Glucose mg/dL 106   BUN mg/dL 11   Creatinine mg/dL 0.8   Albumin g/dL 4.1   Bilirubin Total mg/dL 0.3   ALP unit/L 84   AST unit/L 27   ALT unit/L 19       Drug levels (last 3 results):  No results for input(s): "VANCOMYCINRA", "VANCORANDOM", "VANCOMYCINPE", "VANCOPEAK", "VANCOMYCINTR", "VANCOTROUGH" in the last 72 hours.    Microbiologic Results:  Microbiology Results (last 7 days)       Procedure Component Value Units Date/Time    Blood culture x two cultures. Draw prior to antibiotics. [[id number]] Collected: 07/22/25 1215    Order Status: Resulted Specimen: Blood from Peripheral, " Forearm, Right Updated: 07/22/25 1225    Blood culture x two cultures. Draw prior to antibiotics. [[id number]] Collected: 07/22/25 1215    Order Status: Resulted Specimen: Blood from Peripheral, Antecubital, Left Updated: 07/22/25 1229

## 2025-07-22 NOTE — ED PROVIDER NOTES
Encounter Date: 7/22/2025       History     Chief Complaint   Patient presents with    Hand Pain     Pt to ED today with R hand pain after having surgery at [facility] for septic arthritis 2 weeks ago, pts hand is swollen and looks to be infected.      HPI    Patient is a 45-year-old male with a history of pyogenic arthritis status post surgical I&D on 07/05 that OSH presenting to the ED with hand pain.  Involved joint was MCP of right 3rd finger.  He reports that he had completed his antibiotics, however a couple of days ago, he developed pain.  Notes worsening swelling.  He presented here due to the closer proximity to this hospital.    Denies fevers, chest pain, shortness of breath, nausea, vomiting, abdominal pain.    Review of patient's allergies indicates:  No Known Allergies  No past medical history on file.  No past surgical history on file.  No family history on file.  Social History[1]    Physical Exam     Initial Vitals [07/22/25 1157]   BP Pulse Resp Temp SpO2   124/77 (!) 122 19 97.3 °F (36.3 °C) 99 %      MAP       --         Physical Exam    Constitutional: He appears well-developed and well-nourished. He is not diaphoretic. No distress.   HENT:   Head: Normocephalic and atraumatic.   Cardiovascular:  Regular rhythm and intact distal pulses.           Tachycardic   Pulmonary/Chest: Breath sounds normal. No respiratory distress. He has no wheezes.   Abdominal: He exhibits no distension.   Musculoskeletal:         General: Tenderness (3rd right MCP) and edema (Over 3rd right MCP) present.      Comments: Unable to fully extend the 3rd digit at the MCP; otherwise, normal ROM; the finger itself is not edematous or tender to palpation     Neurological: He is alert. No sensory deficit.   Skin: Skin is warm and dry.   Psychiatric: He has a normal mood and affect. Thought content normal.               ED Course   Procedures  Labs Reviewed   CBC WITH DIFFERENTIAL - Abnormal       Result Value    WBC 9.21      RBC 4.54  (*)     HGB 12.2 (*)     HCT 38.7 (*)     MCV 85      MCH 26.9 (*)     MCHC 31.5 (*)     RDW 13.5      Platelet Count 255      MPV 11.7      Nucleated RBC 0      Neut % 72.4      Lymph % 22.6      Mono % 3.8 (*)     Eos % 0.5      Basophil % 0.5      Imm Grans % 0.2      Neut # 6.66      Lymph # 2.08      Mono # 0.35      Eos # 0.05      Baso # 0.05      Imm Grans # 0.02     COMPREHENSIVE METABOLIC PANEL - Normal    Sodium 143      Potassium 4.1      Chloride 105      CO2 23      Glucose 106      BUN 11      Creatinine 0.8      Calcium 9.3      Protein Total 8.3      Albumin 4.1      Bilirubin Total 0.3      ALP 84      AST 27      ALT 19      Anion Gap 15      eGFR >60      Narrative:     Specimen slightly hemolyzed.   LACTIC ACID, PLASMA - Normal    Lactic Acid Level 1.1      Narrative:     Falsely low lactic acid results can be found in samples containing >=13.0 mg/dL total bilirubin and/or >=3.5 mg/dL direct bilirubin.    PROCALCITONIN - Normal    Procalcitonin <0.02     C-REACTIVE PROTEIN - Normal    CRP 1.0     CULTURE, BLOOD   CULTURE, BLOOD   CBC W/ AUTO DIFFERENTIAL    Narrative:     The following orders were created for panel order CBC auto differential.  Procedure                               Abnormality         Status                     ---------                               -----------         ------                     CBC with Differential[[id number]]       Abnormal            Final result                 Please view results for these tests on the individual orders.   URINALYSIS, REFLEX TO URINE CULTURE   ISTAT LACTATE    POC Lactate 1.48      Sample VENOUS      Site Other      Allens Test N/A       EKG Readings: (Independently Interpreted)   Initial Reading: No STEMI. Rhythm: Sinus Tachycardia. Heart Rate: 113. Ectopy: No Ectopy. Conduction: Normal. T Waves Flipped: AVR and AVL. Axis: Right Axis Deviation. Clinical Impression: Sinus Tachycardia       Imaging Results              CT Hand With  Contrast Right (In process)  Result time 07/22/25 16:42:43                     X-Ray Chest AP Portable (Final result)  Result time 07/22/25 12:37:19      Final result by Moy Sosa MD (07/22/25 12:37:19)                   Impression:      No acute intrathoracic abnormality.      Electronically signed by: Moy Sosa  Date:    07/22/2025  Time:    12:37               Narrative:    EXAMINATION:  XR CHEST AP PORTABLE    CLINICAL HISTORY:  Sepsis;    TECHNIQUE:  Single frontal view of the chest was performed.    COMPARISON:  Chest radiograph 06/15/2020    FINDINGS:  The lungs are clear, with normal appearance of pulmonary vasculature and no pleural effusion or pneumothorax.    The cardiac silhouette is normal in size. The hilar and mediastinal contours are unremarkable.    Bones are intact.                                       Medications   piperacillin-tazobactam (ZOSYN) 4.5 g in D5W 100 mL IVPB (MB+) (0 g Intravenous Stopped 7/22/25 1303)   vancomycin - pharmacy to dose (has no administration in time range)   vancomycin 1,500 mg in 0.9% NaCl 250 mL IVPB (admixture device) (1,500 mg Intravenous Trough Due As Scheduled Before Dose 7/24/25 0000)   vancomycin 1,750 mg in 0.9% NaCl 500 mL IVPB (admixture device) (0 mg Intravenous Stopped 7/22/25 1531)   ketorolac injection 15 mg (15 mg Intravenous Given 7/22/25 1335)   iohexoL (OMNIPAQUE 350) injection 75 mL (75 mLs Intravenous Given 7/22/25 1517)     Medical Decision Making  Amount and/or Complexity of Data Reviewed  Labs: ordered.  Radiology: ordered.    Risk  Prescription drug management.    Patient is a 45-year-old male with a history of pyogenic arthritis status post surgical I&D on 07/05 that OSH presenting to the ED with hand pain.      On presentation, patient is overall well appearing, no acute distress, however he is tachycardic with a initial heart rate documented at 122.  Due to tachycardia and concerning source of infection, sepsis workup was started.  Labs  were ordered and broad-spectrum antibiotics given.  Patient isn't hypotensive or showing otherwise systemic symptoms of infection.    Images CT hand ordered to look for underlying abscess or extension of infection.     Labs reassuring.  No leukocytosis, no elevated lactic acid, and CRP is WNL.    Patient signed out to attending, Dr. Willard, pending CT read with anticipated d/c home with oral abx and instructions to follow up with his hand surgeon.                                       Clinical Impression:  Final diagnoses:  [Z13.6] Screening for cardiovascular condition                       [1]   Social History  Tobacco Use    Smoking status: Every Day     Types: Cigarettes    Smokeless tobacco: Never   Substance Use Topics    Alcohol use: Not Currently    Drug use: Yes     Frequency: 1.0 times per week     Types: Cocaine, Methamphetamines, Heroin     Comment: heroin(nasal)        Mee Thomas DO  Resident  07/22/25 [id number]

## 2025-07-23 LAB — HOLD SPECIMEN: NORMAL

## 2025-07-23 NOTE — ED NOTES
Assumed patient care on bed awake and responsive, not in respiratory distress. Pt is AAOx3, resp even and unlabored, skin warm and dry. NAD noted HOB elivated, SR up x2, call bell with in reach

## 2025-07-26 LAB
OHS QRS DURATION: 94 MS
OHS QTC CALCULATION: 463 MS

## 2025-07-27 LAB
BACTERIA BLD CULT: NORMAL
BACTERIA BLD CULT: NORMAL